# Patient Record
Sex: FEMALE | Race: WHITE | Employment: FULL TIME | URBAN - METROPOLITAN AREA
[De-identification: names, ages, dates, MRNs, and addresses within clinical notes are randomized per-mention and may not be internally consistent; named-entity substitution may affect disease eponyms.]

---

## 2017-01-11 ENCOUNTER — OFFICE VISIT (OUTPATIENT)
Dept: RHEUMATOLOGY | Facility: CLINIC | Age: 42
End: 2017-01-11

## 2017-01-11 VITALS
HEIGHT: 64 IN | DIASTOLIC BLOOD PRESSURE: 85 MMHG | WEIGHT: 223.19 LBS | SYSTOLIC BLOOD PRESSURE: 130 MMHG | HEART RATE: 82 BPM | BODY MASS INDEX: 38.1 KG/M2

## 2017-01-11 DIAGNOSIS — M79.7 FIBROMYALGIA: ICD-10-CM

## 2017-01-11 DIAGNOSIS — M35.00 SJOGREN'S SYNDROME (HCC): Primary | ICD-10-CM

## 2017-01-11 PROCEDURE — 99212 OFFICE O/P EST SF 10 MIN: CPT | Performed by: INTERNAL MEDICINE

## 2017-01-11 PROCEDURE — 99213 OFFICE O/P EST LOW 20 MIN: CPT | Performed by: INTERNAL MEDICINE

## 2017-01-11 NOTE — PROGRESS NOTES
Dear Dr. Shayne Rasheed:    I saw Michaelle Leigh in follow-up this morning in my rheumatology clinic. She is taking cyclobenzaprine 5 mg at bedtime, and feeling much better. She is sleeping. Her energy is good. Her pain levels are much decreased.     I received will continue taking good care of her eyes and teeth. She will schedule a follow-up appointment with me in 6 months. Thank you for inviting me to participate in her care. Sincerely,      Brenden Vallejo MD   Rheumatology.

## 2017-03-06 ENCOUNTER — APPOINTMENT (OUTPATIENT)
Dept: LAB | Facility: HOSPITAL | Age: 42
End: 2017-03-06
Attending: INTERNAL MEDICINE
Payer: COMMERCIAL

## 2017-03-06 ENCOUNTER — OFFICE VISIT (OUTPATIENT)
Dept: RHEUMATOLOGY | Facility: CLINIC | Age: 42
End: 2017-03-06

## 2017-03-06 VITALS
BODY MASS INDEX: 38.27 KG/M2 | HEIGHT: 64 IN | RESPIRATION RATE: 99 BRPM | SYSTOLIC BLOOD PRESSURE: 130 MMHG | DIASTOLIC BLOOD PRESSURE: 85 MMHG | WEIGHT: 224.19 LBS | HEART RATE: 80 BPM

## 2017-03-06 DIAGNOSIS — R53.83 FATIGUE, UNSPECIFIED TYPE: ICD-10-CM

## 2017-03-06 DIAGNOSIS — M35.01 SJOGREN'S SYNDROME WITH KERATOCONJUNCTIVITIS SICCA (HCC): ICD-10-CM

## 2017-03-06 DIAGNOSIS — M79.7 FIBROMYALGIA: ICD-10-CM

## 2017-03-06 DIAGNOSIS — M35.01 SJOGREN'S SYNDROME WITH KERATOCONJUNCTIVITIS SICCA (HCC): Primary | ICD-10-CM

## 2017-03-06 LAB
ALBUMIN SERPL BCP-MCNC: 3.4 G/DL (ref 3.5–4.8)
ALBUMIN/GLOB SERPL: 0.8 {RATIO} (ref 1–2)
ALP SERPL-CCNC: 131 U/L (ref 32–100)
ALT SERPL-CCNC: 253 U/L (ref 14–54)
ANION GAP SERPL CALC-SCNC: 10 MMOL/L (ref 0–18)
AST SERPL-CCNC: 142 U/L (ref 15–41)
BILIRUB SERPL-MCNC: 1.4 MG/DL (ref 0.3–1.2)
BUN SERPL-MCNC: 6 MG/DL (ref 8–20)
BUN/CREAT SERPL: 7.2 (ref 10–20)
C3 SERPL-MCNC: 197 MG/DL (ref 88–201)
C4 SERPL-MCNC: 41 MG/DL (ref 18–55)
CALCIUM SERPL-MCNC: 9.2 MG/DL (ref 8.5–10.5)
CHLORIDE SERPL-SCNC: 104 MMOL/L (ref 95–110)
CO2 SERPL-SCNC: 23 MMOL/L (ref 22–32)
CREAT SERPL-MCNC: 0.83 MG/DL (ref 0.5–1.5)
ERYTHROCYTE [DISTWIDTH] IN BLOOD BY AUTOMATED COUNT: 13.3 % (ref 11–15)
ERYTHROCYTE [SEDIMENTATION RATE] IN BLOOD: 49 MM/HR (ref 0–20)
GLOBULIN PLAS-MCNC: 4.5 G/DL (ref 2.5–3.7)
GLUCOSE SERPL-MCNC: 96 MG/DL (ref 70–99)
HCT VFR BLD AUTO: 40.2 % (ref 35–48)
HGB BLD-MCNC: 13.5 G/DL (ref 12–16)
MCH RBC QN AUTO: 31.9 PG (ref 27–32)
MCHC RBC AUTO-ENTMCNC: 33.5 G/DL (ref 32–37)
MCV RBC AUTO: 95.1 FL (ref 80–100)
OSMOLALITY UR CALC.SUM OF ELEC: 281 MOSM/KG (ref 275–295)
PLATELET # BLD AUTO: 268 K/UL (ref 140–400)
PMV BLD AUTO: 8.4 FL (ref 7.4–10.3)
POTASSIUM SERPL-SCNC: 4.3 MMOL/L (ref 3.3–5.1)
PROT SERPL-MCNC: 7.9 G/DL (ref 5.9–8.4)
RBC # BLD AUTO: 4.23 M/UL (ref 3.7–5.4)
SODIUM SERPL-SCNC: 137 MMOL/L (ref 136–144)
WBC # BLD AUTO: 5.6 K/UL (ref 4–11)

## 2017-03-06 PROCEDURE — 85027 COMPLETE CBC AUTOMATED: CPT

## 2017-03-06 PROCEDURE — 99213 OFFICE O/P EST LOW 20 MIN: CPT | Performed by: INTERNAL MEDICINE

## 2017-03-06 PROCEDURE — 85652 RBC SED RATE AUTOMATED: CPT

## 2017-03-06 PROCEDURE — 84165 PROTEIN E-PHORESIS SERUM: CPT

## 2017-03-06 PROCEDURE — 99212 OFFICE O/P EST SF 10 MIN: CPT | Performed by: INTERNAL MEDICINE

## 2017-03-06 PROCEDURE — 36415 COLL VENOUS BLD VENIPUNCTURE: CPT

## 2017-03-06 PROCEDURE — 86160 COMPLEMENT ANTIGEN: CPT

## 2017-03-06 PROCEDURE — 80053 COMPREHEN METABOLIC PANEL: CPT

## 2017-03-06 RX ORDER — METHYLPREDNISOLONE 4 MG/1
TABLET ORAL
Qty: 21 TABLET | Refills: 0 | Status: SHIPPED | OUTPATIENT
Start: 2017-03-06 | End: 2018-07-20 | Stop reason: ALTCHOICE

## 2017-03-06 NOTE — PROGRESS NOTES
HPI:    Patient ID: Amparo Portillo is a 37year old female. HPI Comments: Yane Ashley is a 60-year-old patient of Dr. Vinicio Desir with diffuse myofascial pain syndrome. That has continued to be much improved on cyclobenzaprine 5 mg at bedtime.       She has a Sertraline HCl 50 MG Oral Tab Take 50 mg by mouth daily. Disp:  Rfl:    Multiple Vitamins-Minerals (MULTIVITAL) Oral Tab Take 1 tablet by mouth daily. Disp:  Rfl:    Cyclobenzaprine HCl 5 MG Oral Tab Take 1-2 PO Q HS.  Disp: 60 tablet Rfl: 2     Allergies summer when she had cholestasis. I will notify her of the results. Orders Placed This Encounter  Comp Metabolic Panel  CBC, Platelet;  No Differential  Serum Protein Electrophoresis  ESR  Complement C3, Serum  Complement C4, Serum    Meds This Visit:

## 2017-03-07 ENCOUNTER — TELEPHONE (OUTPATIENT)
Dept: RHEUMATOLOGY | Facility: CLINIC | Age: 42
End: 2017-03-07

## 2017-03-07 DIAGNOSIS — K83.1 CHOLESTASIS: Primary | ICD-10-CM

## 2017-03-07 LAB
ALBUMIN SERPL ELPH-MCNC: 4.15 G/DL (ref 3.8–5.8)
ALBUMIN/GLOB SERPL: 1.11 {RATIO} (ref 1–2)
ALPHA1 GLOB SERPL ELPH-MCNC: 0.26 G/DL (ref 0.1–0.3)
ALPHA2 GLOB SERPL ELPH-MCNC: 0.95 G/DL (ref 0.6–1)
B-GLOBULIN SERPL ELPH-MCNC: 1.45 G/DL (ref 0.7–1.3)
GAMMA GLOB SERPL ELPH-MCNC: 1.1 G/DL (ref 0.5–1.7)
TOTAL PROTEIN (SPECIAL TESTING): 7.9 G/DL (ref 6.5–9.1)

## 2017-03-07 NOTE — TELEPHONE ENCOUNTER
I called Martha Martinez with her lab results from yesterday. As she expected, her liver tests are again elevated. Her ALT was 253, , alk phos 131, total bili 1.4, albumin 3.4, globulins 4.5. Sed rate 49.   CBC normal.  C3 and C4 complements normal.  SPE

## 2017-03-07 NOTE — TELEPHONE ENCOUNTER
Pt given central schedule phone number to schedule US gallbladder. She will call office back if there are any scheduling issues.

## 2017-03-08 PROBLEM — M35.00 SJOGREN'S DISEASE (HCC): Status: ACTIVE | Noted: 2017-03-08

## 2017-03-08 PROBLEM — M79.7 FIBROMYALGIA: Status: ACTIVE | Noted: 2017-03-08

## 2017-03-08 PROCEDURE — 87624 HPV HI-RISK TYP POOLED RSLT: CPT | Performed by: OBSTETRICS & GYNECOLOGY

## 2017-03-08 PROCEDURE — 88175 CYTOPATH C/V AUTO FLUID REDO: CPT | Performed by: OBSTETRICS & GYNECOLOGY

## 2017-03-09 ENCOUNTER — HOSPITAL ENCOUNTER (OUTPATIENT)
Dept: ULTRASOUND IMAGING | Age: 42
Discharge: HOME | End: 2017-03-09
Attending: INTERNAL MEDICINE
Payer: COMMERCIAL

## 2017-03-09 DIAGNOSIS — K83.1 CHOLESTASIS: ICD-10-CM

## 2017-03-09 PROCEDURE — 76705 ECHO EXAM OF ABDOMEN: CPT

## 2017-03-17 ENCOUNTER — TELEPHONE (OUTPATIENT)
Dept: RHEUMATOLOGY | Facility: CLINIC | Age: 42
End: 2017-03-17

## 2017-03-17 NOTE — TELEPHONE ENCOUNTER
I called Nathanael Langston with the results of her gallbladder ultrasound done on March 9th. There is a prominent gallbladder, with possible mild distention.   A nuclear medicine hepatobiliary study with fatty meal was suggested to evaluate gallbladder wall functio

## 2017-04-28 RX ORDER — CYCLOBENZAPRINE HCL 5 MG
TABLET ORAL
Qty: 60 TABLET | Refills: 2 | Status: SHIPPED | OUTPATIENT
Start: 2017-04-28 | End: 2017-09-14

## 2017-04-28 NOTE — TELEPHONE ENCOUNTER
Patient is requesting refill for Cyclobenzaprine 5 mg, to take 1-2 tablets at bedtime. LOV 03/06/17. Last refill on 12/28/16 for 60 tablets / 2 refills. Please advise.

## 2017-09-14 RX ORDER — CYCLOBENZAPRINE HCL 5 MG
TABLET ORAL
Qty: 60 TABLET | Refills: 0 | Status: SHIPPED | OUTPATIENT
Start: 2017-09-14 | End: 2017-10-12

## 2017-10-12 RX ORDER — CYCLOBENZAPRINE HCL 5 MG
TABLET ORAL
Qty: 60 TABLET | Refills: 0 | Status: SHIPPED | OUTPATIENT
Start: 2017-10-12 | End: 2017-10-18

## 2017-10-12 NOTE — TELEPHONE ENCOUNTER
LOV:3-6  Last Filled:9-14, #60 with 0 refill  Labs:   Future Appointments  Date Time Provider Kaden Aguilar   10/18/2017 11:00 AM Khurram Perry MD [facility] [place] [facility] [place]       Please Advise

## 2017-10-17 NOTE — PROGRESS NOTES
Chema Ricardo is a 81-year-old patient of Dr. Kaitlin Griggs with diffuse myofascial pain syndrome. That has continued to be much improved on cyclobenzaprine 5 mg at bedtime. She will take 10 mg at bedtime on weekends.     6 months ago she stopped her birth control pi Vitamins-Minerals (MULTIVITAL) Oral Tab Take 1 tablet by mouth daily. Disp:  Rfl:    Cyclobenzaprine HCl 5 MG Oral Tab Take 1-2 PO Q HS.  Disp: 60 tablet Rfl: 2     Allergies:No Known Allergies   PHYSICAL EXAM:   Physical Exam   Constitutional: She is orien pills.  She is considering Mirena. This aggravates her fibromyalgia symptoms.

## 2017-10-18 ENCOUNTER — APPOINTMENT (OUTPATIENT)
Dept: LAB | Age: 42
End: 2017-10-18
Attending: INTERNAL MEDICINE
Payer: COMMERCIAL

## 2017-10-18 ENCOUNTER — OFFICE VISIT (OUTPATIENT)
Dept: RHEUMATOLOGY | Facility: CLINIC | Age: 42
End: 2017-10-18

## 2017-10-18 VITALS
HEIGHT: 64 IN | WEIGHT: 221.63 LBS | SYSTOLIC BLOOD PRESSURE: 116 MMHG | HEART RATE: 80 BPM | DIASTOLIC BLOOD PRESSURE: 81 MMHG | BODY MASS INDEX: 37.84 KG/M2

## 2017-10-18 DIAGNOSIS — M35.01 SJOGREN'S SYNDROME WITH KERATOCONJUNCTIVITIS SICCA (HCC): ICD-10-CM

## 2017-10-18 DIAGNOSIS — M35.01 SJOGREN'S SYNDROME WITH KERATOCONJUNCTIVITIS SICCA (HCC): Primary | ICD-10-CM

## 2017-10-18 DIAGNOSIS — M79.7 FIBROMYALGIA: ICD-10-CM

## 2017-10-18 DIAGNOSIS — K83.1 CHOLESTASIS: ICD-10-CM

## 2017-10-18 PROCEDURE — 99212 OFFICE O/P EST SF 10 MIN: CPT | Performed by: INTERNAL MEDICINE

## 2017-10-18 PROCEDURE — 80053 COMPREHEN METABOLIC PANEL: CPT

## 2017-10-18 PROCEDURE — 84165 PROTEIN E-PHORESIS SERUM: CPT

## 2017-10-18 PROCEDURE — 99214 OFFICE O/P EST MOD 30 MIN: CPT | Performed by: INTERNAL MEDICINE

## 2017-10-18 PROCEDURE — 85652 RBC SED RATE AUTOMATED: CPT

## 2017-10-18 PROCEDURE — 36415 COLL VENOUS BLD VENIPUNCTURE: CPT

## 2017-10-18 PROCEDURE — 85027 COMPLETE CBC AUTOMATED: CPT

## 2017-10-18 RX ORDER — CYCLOBENZAPRINE HCL 5 MG
TABLET ORAL
Qty: 180 TABLET | Refills: 3 | Status: SHIPPED | OUTPATIENT
Start: 2017-10-18 | End: 2018-07-20

## 2017-12-21 RX ORDER — CYCLOBENZAPRINE HCL 5 MG
TABLET ORAL
Qty: 180 TABLET | Refills: 3 | Status: SHIPPED | OUTPATIENT
Start: 2017-12-21 | End: 2018-07-20

## 2018-04-20 PROCEDURE — 88175 CYTOPATH C/V AUTO FLUID REDO: CPT | Performed by: OBSTETRICS & GYNECOLOGY

## 2018-04-20 PROCEDURE — 87624 HPV HI-RISK TYP POOLED RSLT: CPT | Performed by: OBSTETRICS & GYNECOLOGY

## 2018-07-16 NOTE — PROGRESS NOTES
Martha Martinez is a 41-year-old patient of Dr. Earlene Villalobos with diffuse myofascial pain syndrome. I last saw her October 18th of 2017. She doesn't think  cyclobenzaprine 5 mg at bedtime during the week, and 10 mg on weekends is working as well.   She does not feel ref Constitutional: Positive for fatigue. Negative for diaphoresis. Respiratory: Negative for cough. Negative for shortness of breath. Cardiovascular: Negative for chest pain. Gastrointestinal: Negative for nausea and abdominal pain.  Negative for vomi liver tests. Her hepatitis B core IgM antibody was positive June of 2016. Her liver tests returned to normal. Sjogren's can be associated with histologic changes of primary biliary cirrhosis. There can be lab abnormalities, usually mild in Sjogren's.  There

## 2018-07-20 ENCOUNTER — APPOINTMENT (OUTPATIENT)
Dept: LAB | Facility: HOSPITAL | Age: 43
End: 2018-07-20
Attending: INTERNAL MEDICINE
Payer: COMMERCIAL

## 2018-07-20 ENCOUNTER — OFFICE VISIT (OUTPATIENT)
Dept: RHEUMATOLOGY | Facility: CLINIC | Age: 43
End: 2018-07-20
Payer: COMMERCIAL

## 2018-07-20 VITALS
DIASTOLIC BLOOD PRESSURE: 83 MMHG | BODY MASS INDEX: 38.65 KG/M2 | SYSTOLIC BLOOD PRESSURE: 113 MMHG | HEIGHT: 64 IN | WEIGHT: 226.38 LBS | HEART RATE: 77 BPM

## 2018-07-20 DIAGNOSIS — K83.1 CHOLESTASIS: ICD-10-CM

## 2018-07-20 DIAGNOSIS — M79.7 FIBROMYALGIA: ICD-10-CM

## 2018-07-20 DIAGNOSIS — M35.01 SJOGREN'S SYNDROME WITH KERATOCONJUNCTIVITIS SICCA (HCC): ICD-10-CM

## 2018-07-20 DIAGNOSIS — M35.01 SJOGREN'S SYNDROME WITH KERATOCONJUNCTIVITIS SICCA (HCC): Primary | ICD-10-CM

## 2018-07-20 LAB
ALBUMIN SERPL BCP-MCNC: 4 G/DL (ref 3.5–4.8)
ALBUMIN/GLOB SERPL: 1 {RATIO} (ref 1–2)
ALP SERPL-CCNC: 81 U/L (ref 32–100)
ALT SERPL-CCNC: 41 U/L (ref 14–54)
ANION GAP SERPL CALC-SCNC: 6 MMOL/L (ref 0–18)
AST SERPL-CCNC: 31 U/L (ref 15–41)
BILIRUB SERPL-MCNC: 0.5 MG/DL (ref 0.3–1.2)
BUN SERPL-MCNC: 11 MG/DL (ref 8–20)
BUN/CREAT SERPL: 12.9 (ref 10–20)
CALCIUM SERPL-MCNC: 9.8 MG/DL (ref 8.5–10.5)
CHLORIDE SERPL-SCNC: 106 MMOL/L (ref 95–110)
CO2 SERPL-SCNC: 26 MMOL/L (ref 22–32)
CREAT SERPL-MCNC: 0.85 MG/DL (ref 0.5–1.5)
ERYTHROCYTE [DISTWIDTH] IN BLOOD BY AUTOMATED COUNT: 13.5 % (ref 11–15)
GLOBULIN PLAS-MCNC: 4 G/DL (ref 2.5–3.7)
GLUCOSE SERPL-MCNC: 84 MG/DL (ref 70–99)
HCT VFR BLD AUTO: 41.6 % (ref 35–48)
HGB BLD-MCNC: 13.7 G/DL (ref 12–16)
MCH RBC QN AUTO: 31.4 PG (ref 27–32)
MCHC RBC AUTO-ENTMCNC: 32.9 G/DL (ref 32–37)
MCV RBC AUTO: 95.6 FL (ref 80–100)
OSMOLALITY UR CALC.SUM OF ELEC: 285 MOSM/KG (ref 275–295)
PATIENT FASTING: YES
PLATELET # BLD AUTO: 259 K/UL (ref 140–400)
PMV BLD AUTO: 7.7 FL (ref 7.4–10.3)
POTASSIUM SERPL-SCNC: 4.6 MMOL/L (ref 3.3–5.1)
PROT SERPL-MCNC: 8 G/DL (ref 5.9–8.4)
RBC # BLD AUTO: 4.36 M/UL (ref 3.7–5.4)
SODIUM SERPL-SCNC: 138 MMOL/L (ref 136–144)
WBC # BLD AUTO: 7.9 K/UL (ref 4–11)

## 2018-07-20 PROCEDURE — 85027 COMPLETE CBC AUTOMATED: CPT

## 2018-07-20 PROCEDURE — 80053 COMPREHEN METABOLIC PANEL: CPT

## 2018-07-20 PROCEDURE — 36415 COLL VENOUS BLD VENIPUNCTURE: CPT

## 2018-07-20 PROCEDURE — 99214 OFFICE O/P EST MOD 30 MIN: CPT | Performed by: INTERNAL MEDICINE

## 2018-07-20 PROCEDURE — 99212 OFFICE O/P EST SF 10 MIN: CPT | Performed by: INTERNAL MEDICINE

## 2018-07-20 PROCEDURE — 84165 PROTEIN E-PHORESIS SERUM: CPT

## 2018-07-20 RX ORDER — GABAPENTIN 300 MG/1
CAPSULE ORAL
Qty: 60 CAPSULE | Refills: 5 | Status: SHIPPED | OUTPATIENT
Start: 2018-07-20 | End: 2019-04-13

## 2018-07-20 RX ORDER — ESCITALOPRAM OXALATE 10 MG/1
10 TABLET ORAL DAILY
COMMUNITY
Start: 2018-07-19

## 2018-07-24 LAB
ALBUMIN SERPL ELPH-MCNC: 3.98 G/DL (ref 3.75–5.21)
ALBUMIN/GLOB SERPL: 1.1 {RATIO} (ref 1–2)
ALPHA1 GLOB SERPL ELPH-MCNC: 0.28 G/DL (ref 0.19–0.46)
ALPHA2 GLOB SERPL ELPH-MCNC: 0.71 G/DL (ref 0.48–1.05)
B-GLOBULIN SERPL ELPH-MCNC: 1.25 G/DL (ref 0.68–1.23)
GAMMA GLOB SERPL ELPH-MCNC: 1.37 G/DL (ref 0.62–1.7)
TOTAL PROTEIN (SPECIAL TESTING): 7.6 G/DL (ref 6.5–9.1)

## 2019-04-13 ENCOUNTER — OFFICE VISIT (OUTPATIENT)
Dept: RHEUMATOLOGY | Facility: CLINIC | Age: 44
End: 2019-04-13
Payer: COMMERCIAL

## 2019-04-13 ENCOUNTER — APPOINTMENT (OUTPATIENT)
Dept: LAB | Facility: HOSPITAL | Age: 44
End: 2019-04-13
Attending: INTERNAL MEDICINE
Payer: COMMERCIAL

## 2019-04-13 VITALS
SYSTOLIC BLOOD PRESSURE: 125 MMHG | DIASTOLIC BLOOD PRESSURE: 87 MMHG | WEIGHT: 230 LBS | HEART RATE: 83 BPM | BODY MASS INDEX: 39.27 KG/M2 | HEIGHT: 64 IN

## 2019-04-13 DIAGNOSIS — K83.1 CHOLESTASIS: ICD-10-CM

## 2019-04-13 DIAGNOSIS — M35.01 SJOGREN'S SYNDROME WITH KERATOCONJUNCTIVITIS SICCA (HCC): ICD-10-CM

## 2019-04-13 DIAGNOSIS — M79.7 FIBROMYALGIA: ICD-10-CM

## 2019-04-13 DIAGNOSIS — M35.01 SJOGREN'S SYNDROME WITH KERATOCONJUNCTIVITIS SICCA (HCC): Primary | ICD-10-CM

## 2019-04-13 PROCEDURE — 36415 COLL VENOUS BLD VENIPUNCTURE: CPT

## 2019-04-13 PROCEDURE — 85027 COMPLETE CBC AUTOMATED: CPT

## 2019-04-13 PROCEDURE — 99212 OFFICE O/P EST SF 10 MIN: CPT | Performed by: INTERNAL MEDICINE

## 2019-04-13 PROCEDURE — 84165 PROTEIN E-PHORESIS SERUM: CPT

## 2019-04-13 PROCEDURE — 99214 OFFICE O/P EST MOD 30 MIN: CPT | Performed by: INTERNAL MEDICINE

## 2019-04-13 PROCEDURE — 80053 COMPREHEN METABOLIC PANEL: CPT

## 2019-04-13 RX ORDER — CYCLOBENZAPRINE HCL 5 MG
TABLET ORAL
Qty: 90 TABLET | Refills: 3 | Status: SHIPPED | OUTPATIENT
Start: 2019-04-13

## 2019-04-13 RX ORDER — CYCLOBENZAPRINE HCL 5 MG
5 TABLET ORAL NIGHTLY
Refills: 0 | COMMUNITY
Start: 2019-02-02 | End: 2019-04-13

## 2019-04-13 NOTE — PROGRESS NOTES
Nathanael Langston is a 21-year-old patient of Dr. Uday Cabrales with diffuse myofascial pain syndrome. I last saw her July 20th of 2018. She is taking cyclobenzaprine 5 mg at bedtime, and Lexapro 10mg daily. It is working well. She has a less stressful job.   She gen Pulmonary/Chest: Breath sounds normal.   Abdominal: Soft. Bowel sounds are normal. She exhibits no mass. There is no tenderness. There is no rebound and no guarding. Musculoskeletal: She exhibits no edema.    There isn't synovitis in her wrists or hands

## 2021-03-26 PROCEDURE — 88305 TISSUE EXAM BY PATHOLOGIST: CPT | Performed by: OBSTETRICS & GYNECOLOGY

## 2022-06-27 ENCOUNTER — OFFICE VISIT (OUTPATIENT)
Dept: RHEUMATOLOGY | Facility: CLINIC | Age: 47
End: 2022-06-27
Payer: COMMERCIAL

## 2022-06-27 ENCOUNTER — LAB ENCOUNTER (OUTPATIENT)
Dept: LAB | Facility: HOSPITAL | Age: 47
End: 2022-06-27
Attending: INTERNAL MEDICINE
Payer: COMMERCIAL

## 2022-06-27 VITALS
SYSTOLIC BLOOD PRESSURE: 129 MMHG | BODY MASS INDEX: 38.24 KG/M2 | HEIGHT: 64 IN | HEART RATE: 71 BPM | WEIGHT: 224 LBS | DIASTOLIC BLOOD PRESSURE: 87 MMHG

## 2022-06-27 DIAGNOSIS — M35.00 SJOGREN'S SYNDROME, WITH UNSPECIFIED ORGAN INVOLVEMENT (HCC): ICD-10-CM

## 2022-06-27 DIAGNOSIS — M79.7 FIBROMYALGIA: ICD-10-CM

## 2022-06-27 DIAGNOSIS — M35.00 SJOGREN'S SYNDROME, WITH UNSPECIFIED ORGAN INVOLVEMENT (HCC): Primary | ICD-10-CM

## 2022-06-27 LAB
ALBUMIN SERPL-MCNC: 3.7 G/DL (ref 3.4–5)
ALBUMIN/GLOB SERPL: 0.8 {RATIO} (ref 1–2)
ALP LIVER SERPL-CCNC: 61 U/L
ALT SERPL-CCNC: 27 U/L
ANION GAP SERPL CALC-SCNC: 7 MMOL/L (ref 0–18)
AST SERPL-CCNC: 22 U/L (ref 15–37)
BILIRUB SERPL-MCNC: 0.2 MG/DL (ref 0.1–2)
BUN BLD-MCNC: 17 MG/DL (ref 7–18)
BUN/CREAT SERPL: 22.1 (ref 10–20)
C3 SERPL-MCNC: 117 MG/DL (ref 90–180)
C4 SERPL-MCNC: 31.8 MG/DL (ref 10–40)
CALCIUM BLD-MCNC: 9.2 MG/DL (ref 8.5–10.1)
CHLORIDE SERPL-SCNC: 104 MMOL/L (ref 98–112)
CO2 SERPL-SCNC: 27 MMOL/L (ref 21–32)
CREAT BLD-MCNC: 0.77 MG/DL
DEPRECATED RDW RBC AUTO: 44.2 FL (ref 35.1–46.3)
ERYTHROCYTE [DISTWIDTH] IN BLOOD BY AUTOMATED COUNT: 12.5 % (ref 11–15)
FASTING STATUS PATIENT QL REPORTED: NO
GLOBULIN PLAS-MCNC: 4.4 G/DL (ref 2.8–4.4)
GLUCOSE BLD-MCNC: 80 MG/DL (ref 70–99)
HCT VFR BLD AUTO: 40.8 %
HGB BLD-MCNC: 13.2 G/DL
MCH RBC QN AUTO: 31.2 PG (ref 26–34)
MCHC RBC AUTO-ENTMCNC: 32.4 G/DL (ref 31–37)
MCV RBC AUTO: 96.5 FL
OSMOLALITY SERPL CALC.SUM OF ELEC: 287 MOSM/KG (ref 275–295)
PLATELET # BLD AUTO: 273 10(3)UL (ref 150–450)
POTASSIUM SERPL-SCNC: 4.3 MMOL/L (ref 3.5–5.1)
PROT SERPL-MCNC: 8.1 G/DL (ref 6.4–8.2)
RBC # BLD AUTO: 4.23 X10(6)UL
SODIUM SERPL-SCNC: 138 MMOL/L (ref 136–145)
WBC # BLD AUTO: 8.1 X10(3) UL (ref 4–11)

## 2022-06-27 PROCEDURE — 86160 COMPLEMENT ANTIGEN: CPT

## 2022-06-27 PROCEDURE — 36415 COLL VENOUS BLD VENIPUNCTURE: CPT

## 2022-06-27 PROCEDURE — 84165 PROTEIN E-PHORESIS SERUM: CPT

## 2022-06-27 PROCEDURE — 80053 COMPREHEN METABOLIC PANEL: CPT

## 2022-06-27 PROCEDURE — 85027 COMPLETE CBC AUTOMATED: CPT

## 2022-06-27 RX ORDER — CYCLOBENZAPRINE HCL 5 MG
TABLET ORAL
Qty: 90 TABLET | Refills: 3 | Status: SHIPPED | OUTPATIENT
Start: 2022-06-27

## 2022-06-27 NOTE — PROGRESS NOTES
Darren Jenkins is a 43-year-old patient of Dr. Radha Crandall, with diffuse myofascial pain syndrome. I last saw her April 13th of 2019. She had been taking cyclobenzaprine 5 mg at bedtime, until running out several months ago. It had definitely been helping. She has been tossing and turning more at night. She has not been refreshed on awakening, and has decreased energy. She remains on Lexapro 10mg daily through Dr. Radha Crandall. It is working well. She has Sjogren's Syndrome, with SSA antibody and positive JOSE ANTONIO. No rash. No pleurisy. She has a mouthwash, and uses Systane eyedrops. Her dry eyes and mouth are reasonable. She has not had swollen glands in her neck. Her last labs were April 13th, 2019. CMP was normal, except ALT of 63, total protein 8.3, globulins 4.6. CBC and SPEP were normal.     In 2017 she stopped her birth control pill because of an episode of cholestasis. She had been on BCPs for PMS. Her ultrasound showed prominent gallbladder, mild fatty infiltration and/or hepatocellular disease. She has a history of cholestasis and elevated liver tests in June of 2016. Her hepatitis B core IgM antibody was positive in June 2016. Her itching went away, and her liver tests return to normal.     Review of Systems   No lymphadenopathy. She is working with a . When she does she hurts more at night making it harder to sleep. No fevers, chills, sweats, anorexia, weight loss, rash, oral or nasal ulcers, shortness of breath, chest pain, acid reflux, stomach pain, nausea or vomiting, constipation or diarrhea, blood in her stools. No trouble urinating. No Raynaud's. She has a lot of tension headaches. Her hair is thinning. When she gets out of bed in the morning she can have discomfort across her hand MCP joints but they are not swollen and she can make tight fist and  objects.     Allergies:No Known Allergies     PHYSICAL EXAM:   Blood pressure 129/87, pulse 71, height 5' 4\" (1.626 m), weight 224 lb (101.6 kg). Constitutional: She is oriented to person, place, and time. She appears well-developed. HENT:   Head: Normocephalic. Eyes: Conjunctivae are normal.   Cardiovascular: Normal rate, regular rhythm and normal heart sounds. Pulmonary/Chest: Breath sounds normal.   Abdominal: Soft. Bowel sounds are normal. She exhibits no mass. There is no tenderness. There is no rebound and no guarding. Musculoskeletal: She exhibits no edema. There isn't synovitis in her wrists or hands.  strength is good. Lymphadenopathy:     She has no cervical adenopathy. Neurological: She is alert and oriented to person, place, and time. Skin: No rash noted. Psychiatric: She has a normal mood and affect. ASSESSMENT/PLAN:   1. Diffuse myofascial pain syndrome. It has flared since she stopped her cyclobenzaprine. It was refilled, at 5 mg every bedtime. Doing well on Lexapro through Dr. Zeferino Yeager for anxiety. 2. History of cholestasis and elevated liver tests. Her hepatitis B core IgM antibody was positive June of 2016. Her liver tests returned to normal. Sjogren's can be associated with histologic changes of primary biliary cirrhosis. There can be lab abnormalities, usually mild in Sjogren's. There can be autoimmune hepatitis. It can be hepatocellular or primarily cholestasis. She had another episode with itching in March of 2017, likely from birth control pills. She has been off BCPs since, without recurrence. Repeat LFTs today. 3. Sjogren's syndrome, with dry eyes and dry mouth, SSA antibody, positive JOSE ANTONIO. She will continue taking good care of her eyes and teeth. CBC, CMP, C3 and C4 complements, and SPEP today. If okay, she will schedule back in 12 months. 4.  PMS. Anxiety. Much improved.

## 2022-06-28 LAB
ALBUMIN SERPL ELPH-MCNC: 4.3 G/DL (ref 3.75–5.21)
ALBUMIN/GLOB SERPL: 1.26 {RATIO} (ref 1–2)
ALPHA1 GLOB SERPL ELPH-MCNC: 0.25 G/DL (ref 0.19–0.46)
ALPHA2 GLOB SERPL ELPH-MCNC: 0.74 G/DL (ref 0.48–1.05)
B-GLOBULIN SERPL ELPH-MCNC: 1.12 G/DL (ref 0.68–1.23)
GAMMA GLOB SERPL ELPH-MCNC: 1.29 G/DL (ref 0.62–1.7)
PROT SERPL-MCNC: 7.7 G/DL (ref 6.4–8.2)

## 2022-07-09 ENCOUNTER — HOSPITAL ENCOUNTER (OUTPATIENT)
Dept: MAMMOGRAPHY | Facility: HOSPITAL | Age: 47
Discharge: HOME | End: 2022-07-09
Attending: FAMILY MEDICINE
Payer: COMMERCIAL

## 2022-07-09 DIAGNOSIS — Z01.419 ENCOUNTER FOR ANNUAL ROUTINE GYNECOLOGICAL EXAMINATION: ICD-10-CM

## 2022-07-09 DIAGNOSIS — Z12.31 ENCOUNTER FOR SCREENING MAMMOGRAM FOR MALIGNANT NEOPLASM OF BREAST: ICD-10-CM

## 2022-07-09 PROCEDURE — 77063 BREAST TOMOSYNTHESIS BI: CPT | Performed by: OBSTETRICS & GYNECOLOGY

## 2022-07-09 PROCEDURE — 77067 SCR MAMMO BI INCL CAD: CPT | Performed by: OBSTETRICS & GYNECOLOGY

## 2022-08-01 ENCOUNTER — HOSPITAL ENCOUNTER (OUTPATIENT)
Dept: MAMMOGRAPHY | Facility: HOSPITAL | Age: 47
Discharge: HOME | End: 2022-08-01
Attending: OBSTETRICS & GYNECOLOGY
Payer: COMMERCIAL

## 2022-08-01 ENCOUNTER — HOSPITAL ENCOUNTER (OUTPATIENT)
Dept: ULTRASOUND IMAGING | Facility: HOSPITAL | Age: 47
Discharge: HOME | End: 2022-08-01
Attending: OBSTETRICS & GYNECOLOGY
Payer: COMMERCIAL

## 2022-08-01 DIAGNOSIS — R92.8 ABNORMAL MAMMOGRAM: ICD-10-CM

## 2022-08-01 PROCEDURE — 76642 ULTRASOUND BREAST LIMITED: CPT | Performed by: OBSTETRICS & GYNECOLOGY

## 2022-08-01 PROCEDURE — 77062 BREAST TOMOSYNTHESIS BI: CPT | Performed by: OBSTETRICS & GYNECOLOGY

## 2022-08-01 PROCEDURE — 77066 DX MAMMO INCL CAD BI: CPT | Performed by: OBSTETRICS & GYNECOLOGY

## 2022-11-29 RX ORDER — CYCLOBENZAPRINE HCL 5 MG
TABLET ORAL
Qty: 90 TABLET | Refills: 3 | Status: SHIPPED | OUTPATIENT
Start: 2022-11-29

## 2022-12-20 ENCOUNTER — TELEPHONE (OUTPATIENT)
Dept: RHEUMATOLOGY | Facility: CLINIC | Age: 47
End: 2022-12-20

## 2022-12-20 NOTE — TELEPHONE ENCOUNTER
Pt is not getting cyclobenzaprine refilled at [facility], it is getting fills at [facility].

## 2023-02-08 ENCOUNTER — TELEPHONE (OUTPATIENT)
Dept: RHEUMATOLOGY | Facility: CLINIC | Age: 48
End: 2023-02-08

## 2023-03-07 ENCOUNTER — HOSPITAL ENCOUNTER (OUTPATIENT)
Dept: ULTRASOUND IMAGING | Facility: HOSPITAL | Age: 48
Discharge: HOME | End: 2023-03-07
Attending: OBSTETRICS & GYNECOLOGY
Payer: COMMERCIAL

## 2023-03-07 ENCOUNTER — HOSPITAL ENCOUNTER (OUTPATIENT)
Dept: MAMMOGRAPHY | Facility: HOSPITAL | Age: 48
Discharge: HOME | End: 2023-03-07
Attending: OBSTETRICS & GYNECOLOGY
Payer: COMMERCIAL

## 2023-03-07 DIAGNOSIS — N63.10 MASS OF RIGHT BREAST, UNSPECIFIED QUADRANT: ICD-10-CM

## 2023-03-07 PROCEDURE — 77066 DX MAMMO INCL CAD BI: CPT | Performed by: OBSTETRICS & GYNECOLOGY

## 2023-03-07 PROCEDURE — 77062 BREAST TOMOSYNTHESIS BI: CPT | Performed by: OBSTETRICS & GYNECOLOGY

## 2023-03-07 PROCEDURE — 76642 ULTRASOUND BREAST LIMITED: CPT | Performed by: OBSTETRICS & GYNECOLOGY

## 2023-03-08 NOTE — DISCHARGE INSTRUCTIONS
The Doctor (Radiologist) who performed your procedure was: DR. Sara Riggs an ice pack over the biopsy site on top of your bra or on top of the ACE wrap (never apply ice directly over skin) for 10-15 minutes of every hour until bedtime for your comfort and to decrease bleeding. Keep your sports bra or the ACE wrap (stereotactic and MRI biopsy) in place for 24 hours after your biopsy. This compression decreases bleeding and breast movement for your comfort. Wear a supportive bra for the next couple of days for comfort (sports bra for sleep). Continue to wear, preferably, a sports bra or good supportive bra for 1 week and take off only to shower. No baths or showers during the first 24 hours after biopsy. After this time you may take a shower. It's okay if the strips get wet but do not soak them. NO saunas, hot tubs or swimming until steri-strips fall off (approx. 5 days). This prevents infection and allows time for them to completely close and heal.  DO NOT remove the steri-strips. They will fall off in 5 days. If any type of irritation (redness, itching or blisters) develops in the area around the steri-strips, remove them gently. If the steri-strips do not fall off after 5 days, gently remove them. Keep the area clean and dry. It is normal to have mild discomfort and bruising at the biopsy site. You may take Tylenol as needed for discomfort, as long as you have no allergies to Tylenol. Do not take aspirin, motrin, ibuprofen or any medication containing NSAID (non-steroidal anti-inflammatory drug) product for 48 hours. DO NOT participate in strenuous activity (aerobics, heavy lifting, housework, gardening, etc.) 48 hours after your biopsy to prevent bleeding. You will receive results in 2-3 business days. If you are having an MRI breast biopsy or an Ultrasound guided breast biopsy, you will be billed for the biopsy and unilateral mammogram separately.   If you have any questions about the procedure or your results, please contact the Breast Care Coordinator Nurse at [phone]. Notify your ordering physician or primary physician for increased bleeding, pain or fever over 100. Or contact a Radiology Nurse at [phone] between 8am-4pm (after 4pm, your call will be directed to the [facility] Emergency Room).

## 2023-03-14 ENCOUNTER — HOSPITAL ENCOUNTER (OUTPATIENT)
Dept: ULTRASOUND IMAGING | Facility: HOSPITAL | Age: 48
Discharge: HOME | End: 2023-03-14
Attending: OBSTETRICS & GYNECOLOGY
Payer: COMMERCIAL

## 2023-03-14 ENCOUNTER — HOSPITAL ENCOUNTER (OUTPATIENT)
Dept: MAMMOGRAPHY | Facility: HOSPITAL | Age: 48
Discharge: HOME | End: 2023-03-14
Attending: OBSTETRICS & GYNECOLOGY
Payer: COMMERCIAL

## 2023-03-14 VITALS — RESPIRATION RATE: 18 BRPM | SYSTOLIC BLOOD PRESSURE: 130 MMHG | DIASTOLIC BLOOD PRESSURE: 80 MMHG | HEART RATE: 75 BPM

## 2023-03-14 DIAGNOSIS — N63.20 BREAST MASS, LEFT: ICD-10-CM

## 2023-03-14 PROCEDURE — 88305 TISSUE EXAM BY PATHOLOGIST: CPT

## 2023-03-14 PROCEDURE — 19083 BX BREAST 1ST LESION US IMAG: CPT | Performed by: OBSTETRICS & GYNECOLOGY

## 2023-03-14 PROCEDURE — 77065 DX MAMMO INCL CAD UNI: CPT | Performed by: OBSTETRICS & GYNECOLOGY

## 2023-03-14 NOTE — IMAGING NOTE
mg/dL 05/2018. Recommended statin therapy for primary risk prevention, which I will refer to primary physician,    741 am Received a call from TANIA Hightower that \"Raine Farmer\" had a vagal response during her us bx and to check on pt before they discharge her\" juice and junaid crackers provided     Upon arrival was in [facility] post images completed color pink resp even and unlabored  verbalizes \" I feel good now \"    745 am vss pulse 75 18 resp bp 130/80 denies c/o    755 am Dr Demario Robles informed of vs and status may be discharged . Mala to  dischargept.

## 2023-03-14 NOTE — PROCEDURES
[facility]  Procedure Note    Dani De Leon Patient Status:  Outpatient    10/31/1975 MRN [id number]   Location [address] Attending Adán Cervantes MD   Hosp Day # 0 PCP JUNIOR GARCIA     Procedure: Left breast ultrasound guided biopsy    Pre-Procedure Diagnosis:  Left breast mass    Post-Procedure Diagnosis: Left breast mass    Anesthesia:  Local    Findings:  Left breast mass    Specimens: multiple cores    Blood Loss:  minimal    Tourniquet Time: none  Complications:  None  Drains:  none        Orlin Georges MD  3/14/2023

## 2023-03-15 ENCOUNTER — TELEPHONE (OUTPATIENT)
Dept: ULTRASOUND IMAGING | Facility: HOSPITAL | Age: 48
End: 2023-03-15

## 2023-03-15 NOTE — TELEPHONE ENCOUNTER
Sheri Rivera is s/p biopsy . Phoned and introduced myself as breast coordinator . Reinforced to patient  post biopsy care and instructions . Informed  and shared the pathology results as well as the recommendations from Dr Josep Sanchez for her breast imaging  as follows:      Pathology results shared (see epic for dictated pathology and radiology procedure report)  and recommendations are as follows:Per the 3/7/2023 report, a six-month follow-up bilateral diagnostic mammogram and bilateral breast ultrasound was recommended 612 Jordon Friend acknowledges the above and denies questions. Sheri Rivera  was also instructed to perform breast self exams and if any changes  develops any changes to contact ordering  physician immediately  for re evaluation. .  Brent Arguello understanding and agreement.

## 2023-03-20 RX ORDER — CYCLOBENZAPRINE HCL 5 MG
TABLET ORAL
Qty: 90 TABLET | Refills: 0 | Status: SHIPPED | OUTPATIENT
Start: 2023-03-20

## 2023-03-20 RX ORDER — CYCLOBENZAPRINE HCL 5 MG
TABLET ORAL
Qty: 90 TABLET | Refills: 0 | OUTPATIENT
Start: 2023-03-20

## 2023-09-05 ENCOUNTER — HOSPITAL ENCOUNTER (OUTPATIENT)
Dept: ULTRASOUND IMAGING | Facility: HOSPITAL | Age: 48
Discharge: HOME | End: 2023-09-05
Attending: OBSTETRICS & GYNECOLOGY
Payer: COMMERCIAL

## 2023-09-05 ENCOUNTER — HOSPITAL ENCOUNTER (OUTPATIENT)
Dept: MAMMOGRAPHY | Facility: HOSPITAL | Age: 48
Discharge: HOME | End: 2023-09-05
Attending: OBSTETRICS & GYNECOLOGY
Payer: COMMERCIAL

## 2023-09-05 DIAGNOSIS — R92.8 ABNORMAL MAMMOGRAM: ICD-10-CM

## 2023-09-05 PROCEDURE — 77066 DX MAMMO INCL CAD BI: CPT | Performed by: OBSTETRICS & GYNECOLOGY

## 2023-09-05 PROCEDURE — 77062 BREAST TOMOSYNTHESIS BI: CPT | Performed by: OBSTETRICS & GYNECOLOGY

## 2023-09-05 PROCEDURE — 76642 ULTRASOUND BREAST LIMITED: CPT | Performed by: OBSTETRICS & GYNECOLOGY

## 2023-10-27 ENCOUNTER — LAB ENCOUNTER (OUTPATIENT)
Dept: LAB | Facility: HOSPITAL | Age: 48
End: 2023-10-27
Attending: SPECIALIST

## 2023-10-27 DIAGNOSIS — Z01.812 PRE-OPERATIVE LABORATORY EXAMINATION: Primary | ICD-10-CM

## 2023-10-27 LAB
ALBUMIN SERPL-MCNC: 3.7 G/DL (ref 3.4–5)
ALBUMIN/GLOB SERPL: 0.8 {RATIO} (ref 1–2)
ALP LIVER SERPL-CCNC: 67 U/L
ALT SERPL-CCNC: 37 U/L
ANION GAP SERPL CALC-SCNC: 3 MMOL/L (ref 0–18)
APTT PPP: 23.9 SECONDS (ref 23.3–35.6)
AST SERPL-CCNC: 27 U/L (ref 15–37)
BILIRUB SERPL-MCNC: 0.3 MG/DL (ref 0.1–2)
BUN BLD-MCNC: 17 MG/DL (ref 7–18)
BUN/CREAT SERPL: 19.5 (ref 10–20)
CALCIUM BLD-MCNC: 9.5 MG/DL (ref 8.5–10.1)
CHLORIDE SERPL-SCNC: 108 MMOL/L (ref 98–112)
CO2 SERPL-SCNC: 27 MMOL/L (ref 21–32)
CREAT BLD-MCNC: 0.87 MG/DL
EGFRCR SERPLBLD CKD-EPI 2021: 83 ML/MIN/1.73M2 (ref 60–?)
FASTING STATUS PATIENT QL REPORTED: NO
GLOBULIN PLAS-MCNC: 4.5 G/DL (ref 2.8–4.4)
GLUCOSE BLD-MCNC: 63 MG/DL (ref 70–99)
INR BLD: 0.96 (ref 0.8–1.2)
OSMOLALITY SERPL CALC.SUM OF ELEC: 286 MOSM/KG (ref 275–295)
POTASSIUM SERPL-SCNC: 4.4 MMOL/L (ref 3.5–5.1)
PROT SERPL-MCNC: 8.2 G/DL (ref 6.4–8.2)
PROTHROMBIN TIME: 13.4 SECONDS (ref 11.6–14.8)
SODIUM SERPL-SCNC: 138 MMOL/L (ref 136–145)

## 2023-10-27 PROCEDURE — 85610 PROTHROMBIN TIME: CPT

## 2023-10-27 PROCEDURE — 80053 COMPREHEN METABOLIC PANEL: CPT

## 2023-10-27 PROCEDURE — 36415 COLL VENOUS BLD VENIPUNCTURE: CPT

## 2023-10-27 PROCEDURE — 85730 THROMBOPLASTIN TIME PARTIAL: CPT

## 2023-12-01 RX ORDER — ESTRADIOL 1 MG/1
1 TABLET ORAL DAILY
COMMUNITY

## 2023-12-05 ENCOUNTER — HOSPITAL ENCOUNTER (OUTPATIENT)
Facility: HOSPITAL | Age: 48
Discharge: HOME | End: 2023-12-06
Attending: SPECIALIST | Admitting: SPECIALIST
Payer: COMMERCIAL

## 2023-12-05 ENCOUNTER — ANESTHESIA EVENT (OUTPATIENT)
Dept: SURGERY | Facility: HOSPITAL | Age: 48
End: 2023-12-05
Payer: COMMERCIAL

## 2023-12-05 ENCOUNTER — ANESTHESIA (OUTPATIENT)
Dept: SURGERY | Facility: HOSPITAL | Age: 48
End: 2023-12-05
Payer: COMMERCIAL

## 2023-12-05 PROBLEM — N62 MACROMASTIA: Status: ACTIVE | Noted: 2023-12-05

## 2023-12-05 LAB — B-HCG UR QL: NEGATIVE

## 2023-12-05 PROCEDURE — 0HBV0ZZ EXCISION OF BILATERAL BREAST, OPEN APPROACH: ICD-10-PCS | Performed by: SPECIALIST

## 2023-12-05 PROCEDURE — 81025 URINE PREGNANCY TEST: CPT

## 2023-12-05 PROCEDURE — 88305 TISSUE EXAM BY PATHOLOGIST: CPT | Performed by: SPECIALIST

## 2023-12-05 RX ORDER — MIDAZOLAM HYDROCHLORIDE 1 MG/ML
INJECTION INTRAMUSCULAR; INTRAVENOUS AS NEEDED
Status: DISCONTINUED | OUTPATIENT
Start: 2023-12-05 | End: 2023-12-05 | Stop reason: SURG

## 2023-12-05 RX ORDER — PROCHLORPERAZINE EDISYLATE 5 MG/ML
5 INJECTION INTRAMUSCULAR; INTRAVENOUS EVERY 8 HOURS PRN
Status: DISCONTINUED | OUTPATIENT
Start: 2023-12-05 | End: 2023-12-05 | Stop reason: HOSPADM

## 2023-12-05 RX ORDER — ACETAMINOPHEN 325 MG/1
650 TABLET ORAL EVERY 4 HOURS PRN
Status: DISCONTINUED | OUTPATIENT
Start: 2023-12-05 | End: 2023-12-06

## 2023-12-05 RX ORDER — SCOLOPAMINE TRANSDERMAL SYSTEM 1 MG/1
1 PATCH, EXTENDED RELEASE TRANSDERMAL
Status: DISCONTINUED | OUTPATIENT
Start: 2023-12-05 | End: 2023-12-05 | Stop reason: HOSPADM

## 2023-12-05 RX ORDER — PROCHLORPERAZINE EDISYLATE 5 MG/ML
5 INJECTION INTRAMUSCULAR; INTRAVENOUS EVERY 8 HOURS PRN
Status: DISCONTINUED | OUTPATIENT
Start: 2023-12-05 | End: 2023-12-06

## 2023-12-05 RX ORDER — MORPHINE SULFATE 4 MG/ML
4 INJECTION, SOLUTION INTRAMUSCULAR; INTRAVENOUS EVERY 10 MIN PRN
Status: DISCONTINUED | OUTPATIENT
Start: 2023-12-05 | End: 2023-12-05 | Stop reason: HOSPADM

## 2023-12-05 RX ORDER — MORPHINE SULFATE 2 MG/ML
2 INJECTION, SOLUTION INTRAMUSCULAR; INTRAVENOUS EVERY 2 HOUR PRN
Status: DISCONTINUED | OUTPATIENT
Start: 2023-12-05 | End: 2023-12-06

## 2023-12-05 RX ORDER — MORPHINE SULFATE 4 MG/ML
2 INJECTION, SOLUTION INTRAMUSCULAR; INTRAVENOUS EVERY 10 MIN PRN
Status: DISCONTINUED | OUTPATIENT
Start: 2023-12-05 | End: 2023-12-05 | Stop reason: HOSPADM

## 2023-12-05 RX ORDER — CEFAZOLIN SODIUM/WATER 2 G/20 ML
2 SYRINGE (ML) INTRAVENOUS ONCE
Status: COMPLETED | OUTPATIENT
Start: 2023-12-05 | End: 2023-12-05

## 2023-12-05 RX ORDER — DEXTROSE, SODIUM CHLORIDE, SODIUM LACTATE, POTASSIUM CHLORIDE, AND CALCIUM CHLORIDE 5; .6; .31; .03; .02 G/100ML; G/100ML; G/100ML; G/100ML; G/100ML
INJECTION, SOLUTION INTRAVENOUS CONTINUOUS
Status: DISCONTINUED | OUTPATIENT
Start: 2023-12-05 | End: 2023-12-06

## 2023-12-05 RX ORDER — FAMOTIDINE 10 MG/ML
20 INJECTION, SOLUTION INTRAVENOUS ONCE
Status: COMPLETED | OUTPATIENT
Start: 2023-12-05 | End: 2023-12-05

## 2023-12-05 RX ORDER — BUPIVACAINE HYDROCHLORIDE 2.5 MG/ML
INJECTION, SOLUTION EPIDURAL; INFILTRATION; INTRACAUDAL AS NEEDED
Status: DISCONTINUED | OUTPATIENT
Start: 2023-12-05 | End: 2023-12-05 | Stop reason: HOSPADM

## 2023-12-05 RX ORDER — ONDANSETRON 2 MG/ML
4 INJECTION INTRAMUSCULAR; INTRAVENOUS EVERY 6 HOURS PRN
Status: DISCONTINUED | OUTPATIENT
Start: 2023-12-05 | End: 2023-12-06

## 2023-12-05 RX ORDER — ONDANSETRON 2 MG/ML
INJECTION INTRAMUSCULAR; INTRAVENOUS AS NEEDED
Status: DISCONTINUED | OUTPATIENT
Start: 2023-12-05 | End: 2023-12-05 | Stop reason: SURG

## 2023-12-05 RX ORDER — HYDROMORPHONE HYDROCHLORIDE 1 MG/ML
0.6 INJECTION, SOLUTION INTRAMUSCULAR; INTRAVENOUS; SUBCUTANEOUS EVERY 5 MIN PRN
Status: DISCONTINUED | OUTPATIENT
Start: 2023-12-05 | End: 2023-12-05 | Stop reason: HOSPADM

## 2023-12-05 RX ORDER — CEFAZOLIN SODIUM/WATER 2 G/20 ML
2 SYRINGE (ML) INTRAVENOUS EVERY 8 HOURS
Qty: 40 ML | Refills: 0 | Status: COMPLETED | OUTPATIENT
Start: 2023-12-05 | End: 2023-12-06

## 2023-12-05 RX ORDER — LIDOCAINE HYDROCHLORIDE 10 MG/ML
INJECTION, SOLUTION EPIDURAL; INFILTRATION; INTRACAUDAL; PERINEURAL AS NEEDED
Status: DISCONTINUED | OUTPATIENT
Start: 2023-12-05 | End: 2023-12-05 | Stop reason: SURG

## 2023-12-05 RX ORDER — TEMAZEPAM 15 MG/1
15 CAPSULE ORAL NIGHTLY PRN
Status: DISCONTINUED | OUTPATIENT
Start: 2023-12-05 | End: 2023-12-06

## 2023-12-05 RX ORDER — MORPHINE SULFATE 2 MG/ML
1 INJECTION, SOLUTION INTRAMUSCULAR; INTRAVENOUS EVERY 2 HOUR PRN
Status: DISCONTINUED | OUTPATIENT
Start: 2023-12-05 | End: 2023-12-06

## 2023-12-05 RX ORDER — HYDROCODONE BITARTRATE AND ACETAMINOPHEN 5; 325 MG/1; MG/1
2 TABLET ORAL EVERY 4 HOURS PRN
Status: DISCONTINUED | OUTPATIENT
Start: 2023-12-05 | End: 2023-12-06

## 2023-12-05 RX ORDER — MORPHINE SULFATE 4 MG/ML
4 INJECTION, SOLUTION INTRAMUSCULAR; INTRAVENOUS EVERY 2 HOUR PRN
Status: DISCONTINUED | OUTPATIENT
Start: 2023-12-05 | End: 2023-12-06

## 2023-12-05 RX ORDER — SODIUM CHLORIDE, SODIUM LACTATE, POTASSIUM CHLORIDE, CALCIUM CHLORIDE 600; 310; 30; 20 MG/100ML; MG/100ML; MG/100ML; MG/100ML
INJECTION, SOLUTION INTRAVENOUS CONTINUOUS
Status: DISCONTINUED | OUTPATIENT
Start: 2023-12-05 | End: 2023-12-05

## 2023-12-05 RX ORDER — HYDROMORPHONE HYDROCHLORIDE 1 MG/ML
0.2 INJECTION, SOLUTION INTRAMUSCULAR; INTRAVENOUS; SUBCUTANEOUS EVERY 5 MIN PRN
Status: DISCONTINUED | OUTPATIENT
Start: 2023-12-05 | End: 2023-12-05 | Stop reason: HOSPADM

## 2023-12-05 RX ORDER — ONDANSETRON 2 MG/ML
4 INJECTION INTRAMUSCULAR; INTRAVENOUS EVERY 6 HOURS PRN
Status: DISCONTINUED | OUTPATIENT
Start: 2023-12-05 | End: 2023-12-05 | Stop reason: HOSPADM

## 2023-12-05 RX ORDER — SODIUM CHLORIDE, SODIUM LACTATE, POTASSIUM CHLORIDE, CALCIUM CHLORIDE 600; 310; 30; 20 MG/100ML; MG/100ML; MG/100ML; MG/100ML
INJECTION, SOLUTION INTRAVENOUS CONTINUOUS
Status: DISCONTINUED | OUTPATIENT
Start: 2023-12-05 | End: 2023-12-05 | Stop reason: HOSPADM

## 2023-12-05 RX ORDER — DEXAMETHASONE SODIUM PHOSPHATE 4 MG/ML
VIAL (ML) INJECTION AS NEEDED
Status: DISCONTINUED | OUTPATIENT
Start: 2023-12-05 | End: 2023-12-05 | Stop reason: SURG

## 2023-12-05 RX ORDER — HYDROMORPHONE HYDROCHLORIDE 1 MG/ML
0.4 INJECTION, SOLUTION INTRAMUSCULAR; INTRAVENOUS; SUBCUTANEOUS EVERY 5 MIN PRN
Status: DISCONTINUED | OUTPATIENT
Start: 2023-12-05 | End: 2023-12-05 | Stop reason: HOSPADM

## 2023-12-05 RX ORDER — MORPHINE SULFATE 10 MG/ML
6 INJECTION, SOLUTION INTRAMUSCULAR; INTRAVENOUS EVERY 10 MIN PRN
Status: DISCONTINUED | OUTPATIENT
Start: 2023-12-05 | End: 2023-12-05 | Stop reason: HOSPADM

## 2023-12-05 RX ORDER — NALOXONE HYDROCHLORIDE 0.4 MG/ML
0.08 INJECTION, SOLUTION INTRAMUSCULAR; INTRAVENOUS; SUBCUTANEOUS AS NEEDED
Status: DISCONTINUED | OUTPATIENT
Start: 2023-12-05 | End: 2023-12-05 | Stop reason: HOSPADM

## 2023-12-05 RX ORDER — HYDROCODONE BITARTRATE AND ACETAMINOPHEN 5; 325 MG/1; MG/1
1 TABLET ORAL EVERY 4 HOURS PRN
Status: DISCONTINUED | OUTPATIENT
Start: 2023-12-05 | End: 2023-12-06

## 2023-12-05 RX ORDER — FAMOTIDINE 20 MG/1
20 TABLET, FILM COATED ORAL ONCE
Status: COMPLETED | OUTPATIENT
Start: 2023-12-05 | End: 2023-12-05

## 2023-12-05 RX ORDER — ACETAMINOPHEN 500 MG
1000 TABLET ORAL ONCE
Status: DISCONTINUED | OUTPATIENT
Start: 2023-12-05 | End: 2023-12-05 | Stop reason: HOSPADM

## 2023-12-05 RX ADMIN — MIDAZOLAM HYDROCHLORIDE 2 MG: 1 INJECTION INTRAMUSCULAR; INTRAVENOUS at 12:43:00

## 2023-12-05 RX ADMIN — LIDOCAINE HYDROCHLORIDE 50 MG: 10 INJECTION, SOLUTION EPIDURAL; INFILTRATION; INTRACAUDAL; PERINEURAL at 12:43:00

## 2023-12-05 RX ADMIN — CEFAZOLIN SODIUM/WATER 2 G: 2 G/20 ML SYRINGE (ML) INTRAVENOUS at 12:48:00

## 2023-12-05 RX ADMIN — DEXAMETHASONE SODIUM PHOSPHATE 4 MG: 4 MG/ML VIAL (ML) INJECTION at 12:43:00

## 2023-12-05 RX ADMIN — ONDANSETRON 4 MG: 2 INJECTION INTRAMUSCULAR; INTRAVENOUS at 12:43:00

## 2023-12-05 NOTE — DISCHARGE INSTRUCTIONS
KAN drain care as instructed - empty, record and strip KAN drains as needed to keep a kink in the KAN bulb. Please reinforce KAN drain sites with 4x4 gauze as needed if there is drainage from under the clear plastic adhesive dressing. Do not remove any plastic adhesive dressings covering KAN drain skin sites or pain pump sites. May remove garment briefly if needed, then replace promptly. Begin antibiotics tonight as instructed; Pain medication as needed for pain. Norco or Tylenol as needed for pain; May start OTC NSAID (Advil or Ibuprofen) 48 hours after surgery for pain; Ambulate and deep breathe regularly. Hold on shower until pain pumps and KAN drains removed. May sponge bathe as needed. Return to clinic as scheduled.

## 2023-12-05 NOTE — DISCHARGE SUMMARY
[facility]    Discharge Summary    Andrea Roberts Patient Status:  Outpatient in a Bed    10/31/1975 MRN [id number]   Location [address] [facility] Attending Xin Woodruff MD   Hosp Day # 0 PCP Daivd Proper     Date of Admission: 2023   Date of Discharge: 2023    Admitting Diagnosis: Macromastia, hypertrophy of breasts    Disposition: Discharge to home    Discharge Diagnosis: macromastia      Hospital Course:   Reason for Admission: Macromastia, hypertrophy of breasts      Surgical Procedures       Case IDs Date Procedure Surgeon Location Status    [id number] 23 Bilateral breast reduction with GIUSEPPE drain and pain pump placement MD Harry Halljc [address] Course: Uncomplicated    Complications: None        Discharge Plan:   Discharge Condition:Good    Current Discharge Medication List        Home Meds - Unchanged    Details   estradiol 1 MG Oral Tab Take 1 tablet (1 mg total) by mouth daily. Ascorbic Acid (VITAMIN C OR) Take 1 tablet by mouth daily. triamcinolone 0.5 % External Ointment Apply 1 Application topically twice a week. escitalopram 10 MG Oral Tab Take 1 tablet (10 mg total) by mouth daily. Multiple Vitamins-Minerals (MULTIVITAL) Oral Tab Take 1 tablet by mouth daily. cyclobenzaprine 5 MG Oral Tab Take one Q HS. Discharge Diet: Low sodium diet (1500mg/day)    Discharge Activity: As tolerated    Follow up: Follow-up Information       Xin Woodruff MD Follow up in 1 day(s). Specialty: SURGERY, PLASTIC  Why: Giuseppe drain and pain pump removal  Contact information:  [place]  [phone]                                 Other Discharge Instructions:         GIUSEPPE drain care as instructed - empty, record and strip GIUSEPPE drains as needed to keep a kink in the GIUSEPPE bulb.   Please reinforce GIUSEPPE drain sites with 4x4 gauze as needed if there is drainage from under the clear plastic adhesive dressing. Do not remove any plastic adhesive dressings covering KAN drain skin sites or pain pump sites. May remove garment briefly if needed, then replace promptly. Begin antibiotics tonight as instructed; Pain medication as needed for pain. Norco or Tylenol as needed for pain; May start OTC NSAID (Advil or Ibuprofen) 48 hours after surgery for pain; Ambulate and deep breathe regularly. Hold on shower until pain pumps and KAN drains removed. May sponge bathe as needed. Return to clinic as scheduled.          Kar Kraft MD  12/5/2023

## 2023-12-05 NOTE — BRIEF OP NOTE
Pre-Operative Diagnosis: Macromastia, hypertrophy of breasts     Post-Operative Diagnosis: Macromastia, hypertrophy of breasts      Procedure Performed:   Bilateral breast reduction with KAN drain and pain pump placement    Surgeon(s) and Role:     Carrie Sanders MD - Primary    Assistant(s):  Surgical Assistant.: Juliana Park     Surgical Findings: Nothing unusual     Specimen: R and L breast tissue     Estimated Blood Loss: Blood Output: 100 mL (12/5/2023  2:44 PM)      Dictation Number:  Dictated    Royal Pan MD  12/5/2023  2:55 PM

## 2023-12-05 NOTE — H&P
History & Physical Examination    Patient Name: Harriet [place]  MRN: [id number]  CSN: [id number]  YOB: 1975    Diagnosis: Macromastia    Present Illness: 54 yo presents with complaints of macromastia and requests a bilateral breast reduction with Giuseppe drain and pain pump placement. All risks and benefits and alternatives to surgery discussed and questions answered in detail and patient consents to proceed. Medications Prior to Admission   Medication Sig Dispense Refill Last Dose    estradiol 1 MG Oral Tab Take 1 tablet (1 mg total) by mouth daily. 12/5/2023 at 0800    Ascorbic Acid (VITAMIN C OR) Take 1 tablet by mouth daily. 12/4/2023 at 0730    triamcinolone 0.5 % External Ointment Apply 1 Application topically twice a week. 45 g 3 Past Week    escitalopram 10 MG Oral Tab Take 1 tablet (10 mg total) by mouth daily. 12/5/2023 at 0800    Multiple Vitamins-Minerals (MULTIVITAL) Oral Tab Take 1 tablet by mouth daily. 12/4/2023 at 0730    cyclobenzaprine 5 MG Oral Tab Take one Q HS.  90 tablet 0 More than a month     Current Facility-Administered Medications   Medication Dose Route Frequency    lactated ringers infusion   Intravenous Continuous    acetaminophen (Tylenol Extra Strength) tab 1,000 mg  1,000 mg Oral Once    ceFAZolin (Ancef) 2 g in 20mL IV syringe premix  2 g Intravenous Once    scopolamine (Transderm-Scop) 1 MG/3DAYS patch 1 patch  1 patch Transdermal Q72H       Allergies: No Known Allergies    Past Medical History:   Diagnosis Date    Attention deficit hyperactivity disorder (ADHD)     Depression     Fibromyalgia     Hx of motion sickness     PONV (postoperative nausea and vomiting)     Sjogren's disease (HCC)     Visual impairment     glasses     Past Surgical History:   Procedure Laterality Date    D & C      NEEDLE BIOPSY LEFT       Family History   Problem Relation Age of Onset    Breast Cancer Mother 66    Hypertension Mother     Heart Attack Mother 46    Bipolar Disorder Mother     Hypertension Father     Hypertension Brother     Breast Cancer Maternal Cousin Female         35s     Social History     Tobacco Use    Smoking status: Never    Smokeless tobacco: Never   Substance Use Topics    Alcohol use: Not Currently       SYSTEM Check if Review is Normal Check if Physical Exam is Abnormal If not normal, please explain:   HEENT [x ] [ ]    NECK & BACK [x ] [ ]    HEART [x ] [ ]    LUNGS [x ] [ ]    ABDOMEN [ x] [ ]    Jamas Eis [ x] [ ]    EXTREMITIES [ x] [ ]    Rae Morales [ ] [x ] macromastia   OTHER        [ x ] I have discussed the risks and benefits and alternatives with the patient/family. [ x ] The above referenced H&P was reviewed by Ronna Lantigua MD on 12/5/2023 the patient was examined and no significant changes have occurred in the patient's condition since the H&P was performed. I discussed with the patient and/or legal representative the potential benefits, risks and side effects of this procedure; the likelihood of the patient achieving goals; and potential problems that might occur during recuperation. I discussed reasonable alternatives to the procedure, including risks, benefits and side effects related to the alternatives and risks related to not receiving this procedure. We will proceed with procedure as planned. All questions have been answered in detail and they understand and agree to proceed with plan of care. [ x ] I have reviewed the History and Physical done within the last 30 days. Any changes noted above.     Flakito Stallings MD  12/5/2023  12:13 PM

## 2023-12-05 NOTE — ANESTHESIA PROCEDURE NOTES
Airway  Date/Time: 12/5/2023 12:48 PM  Urgency: Elective      General Information and Staff    Patient location during procedure: OR  Anesthesiologist: Cecy Britt MD  Resident/CRNA: Cherelle White CRNA  Performed: anesthesiologist   Performed by: Cherelle White CRNA  Authorized by: Cecy Britt MD      Indications and Patient Condition  Indications for airway management: anesthesia  Sedation level: deep  Preoxygenated: yes  Patient position: sniffing  Mask difficulty assessment: 1 - vent by mask    Final Airway Details  Final airway type: endotracheal airway      Successful airway: ETT  Cuffed: yes   Successful intubation technique: direct laryngoscopy  Facilitating devices/methods: intubating stylet  Endotracheal tube insertion site: oral  Blade: Brian  ETT size (mm): 7.0    Cormack-Lehane Classification: grade I - full view of glottis  Placement verified by: capnometry   Measured from: teeth  Number of attempts at approach: 1

## 2023-12-05 NOTE — ANESTHESIA POSTPROCEDURE EVALUATION
Patient: Tati Combs    Procedure Summary       Date: 12/05/23 Room / Location: [facility] OR  / [facility] MAIN OR    Anesthesia Start: 2684 Anesthesia Stop: 7298    Procedure: Bilateral breast reduction with KAN drain and pain pump placement (Bilateral: Breast) Diagnosis: (Macromastia, hypertrophy of breasts)    Surgeons: Sepideh Merritt MD Anesthesiologist: Taj Leiva MD    Anesthesia Type: general ASA Status: 2            Anesthesia Type: general    Vitals Value Taken Time   /78 12/05/23 1522   Temp  12/05/23 1522   Pulse 95 12/05/23 1522   Resp 15 12/05/23 1522   SpO2 92 % 12/05/23 1522   Vitals shown include unfiled device data.     [facility] Post Evaluation:   Patient Evaluated in PACU  Patient Participation: complete - patient participated  Level of Consciousness: awake  Pain Management: adequate  Airway Patency:patent  Yes    Cardiovascular Status: acceptable  Respiratory Status: acceptable  Postoperative Hydration acceptable      Janine Amin MD  12/5/2023 3:22 PM

## 2023-12-06 VITALS
TEMPERATURE: 98 F | HEART RATE: 66 BPM | WEIGHT: 223 LBS | DIASTOLIC BLOOD PRESSURE: 60 MMHG | OXYGEN SATURATION: 96 % | RESPIRATION RATE: 18 BRPM | SYSTOLIC BLOOD PRESSURE: 103 MMHG | HEIGHT: 64 IN | BODY MASS INDEX: 38.07 KG/M2

## 2023-12-06 PROBLEM — N62 MACROMASTIA: Status: RESOLVED | Noted: 2023-12-05 | Resolved: 2023-12-06

## 2023-12-06 NOTE — OPERATIVE REPORT
[facility]    PATIENT'S NAME: Hermelinda Woods   ATTENDING PHYSICIAN: Denisa Persaud MD   OPERATING PHYSICIAN: Denisa Hernandez MD   PATIENT ACCOUNT#:   [id number]    LOCATION:   Room 8 A [facility]  MEDICAL RECORD #:   [id number]       YOB: 1975  ADMISSION DATE:       12/05/2023      OPERATION DATE:  12/05/2023    OPERATIVE REPORT      PREOPERATIVE DIAGNOSIS:  Bilateral symptomatic macromastia. POSTOPERATIVE DIAGNOSIS:  Bilateral symptomatic macromastia. PROCEDURE:  Bilateral breast reduction with Naun-Gonzalez drain and pain pump placement. ASSISTANT:  Shruthi Joseph CSA. ANESTHESIA:  General endotracheal anesthesia. ESTIMATED BLOOD LOSS:  100 mL. INTRAVENOUS FLUIDS:  2000 mL of crystalloid. COMPLICATIONS:  None. DRAINS PLACED:  One 7 mm flat KAN drain in each lateral breast pocket and 1 On-Q pain pump catheter in each breast pocket for postop pain control. SPECIMEN REMOVED:  Right breast 1003 g, left breast 1056 g. DISPOSITION:  Patient tolerated the procedure well, was awakened from anesthesia, and transferred to recovery room in good condition. INDICATIONS:  This is a 73-year-old patient who presented to my office with complaints of bilateral symptomatic macromastia. She requested a bilateral breast reduction procedure. We discussed the operation in detail at 2 separate preoperative visits.   She was made aware of the risks of bleeding, infection, scarring, asymmetry, contour irregularities, wound dehiscence with the need for secondary wound healing, skin flap necrosis with the need for secondary wound healing, nipple necrosis with the need for secondary wound healing, skin flap sensory loss that can be permanent in nature, nipple-areolar complex sensory loss that can be permanent in nature, no guarantee on final breast cup size, use of KAN drains and pain pumps with the operation, need for revisional surgery, overall dissatisfaction with final aesthetic outcome, and the rare but possible risk of DVT, PE, MI, COVID-19 infection, sepsis, and death and, understanding these risks and limitations, she wished to proceed. I answered all her questions in detail regarding the procedure at 2 separate preoperative visits and again in the preop holding area. Once all questions were answered and consents obtained, the procedure went as follows. OPERATIVE TECHNIQUE:  Patient was taken to the operating room, placed on the table in supine position. General endotracheal anesthesia was induced without any complications. The anterior chest wall area was prepped and draped in normal sterile fashion. At the beginning of the procedure, approximately 300 mL of tumescent solution was infiltrated around the periphery of each breast pedicle. At this point, the 50 mm cookie cutter was used to imprint the nipple-areolar complex on each side, and the skin was deepithelialized between the edge of the nipple-areolar complex and the edge of the pedicle on each side. At this point, the incisions around the periphery of the pedicles were deepened with a 10 blade for a distance of approximately 1.5 cm and the skin flaps were elevated off the underlying breast gland, keeping a thickness of approximately 1.5 cm first medially, then superiorly, and then laterally. At this point, the breast glands were debulked in appropriate size for the patient's body habitus and as per insurance requirements with a minimum of 1000 g of breast tissue be removed from each breast.  The skin flaps were brought down over the reduced glands, secured at a point 13 cm off the midline along each inframammary fold, and the medial and lateral dog ears were marked, incised sharply, and removed. Skin flaps were reopened. Bipolar was used for hemostasis throughout.   The areas were irrigated multiple times with warm antibiotic irrigation as well with Betadine and, once again, bipolar was again used for hemostasis throughout. Stab incisions were made laterally for placement of 7 mm flat KAN drains and On-Q pain pump catheters were placed into the breast pockets as well. At this point, the pedicles were tacked to the anterior chest wall with 2 interrupted sutures of 2-0 PDS, 1 medial and 1 lateral, and then the skin flaps were brought down and sewn in place with 2-0 Vicryl and 4-0 Prolene. The patient was seated into an upright sitting position and found to have excellent breast shape and symmetry. The new positions for the nipple-areolar complexes were marked with the 42 mm cookie cutter and measured for symmetry from both the sternal notch and midline. Once symmetry was ensured, the markings were incised, the skin and underlying subcutaneous tissue was removed sharply. Bipolar was used for hemostasis along the fresh incisional edge and then the original nipple-areolar complexes were pink and viable with no evidence of venous congestion or ischemia, were brought out, and sewn in place with 3-0 Vicryl and 4-0 Prolene. Mastisol and Steri-Strips were applied along the incision line. Mastisol, Steris, Biopatches, and Tegaderm were used to secure the KAN drain and pain pumps. The pain pumps were bolused with 10 mL of 0.5% Marcaine on each side. The drains were placed on bulb suction. The patient was then placed in support bra, awakened from anesthesia, and transferred to recovery room in good condition. Dictated By Sunita Persaud MD  d: 12/05/2023 15:02:38  t: 12/05/2023 18:23:35  [facility] [id number]  [facility]/

## 2024-12-09 ENCOUNTER — OFFICE VISIT (OUTPATIENT)
Dept: RHEUMATOLOGY | Facility: CLINIC | Age: 49
End: 2024-12-09
Payer: COMMERCIAL

## 2024-12-09 ENCOUNTER — LAB ENCOUNTER (OUTPATIENT)
Dept: LAB | Facility: HOSPITAL | Age: 49
End: 2024-12-09
Attending: INTERNAL MEDICINE
Payer: COMMERCIAL

## 2024-12-09 ENCOUNTER — HOSPITAL ENCOUNTER (OUTPATIENT)
Dept: GENERAL RADIOLOGY | Facility: HOSPITAL | Age: 49
Discharge: HOME | End: 2024-12-09
Attending: INTERNAL MEDICINE
Payer: COMMERCIAL

## 2024-12-09 VITALS
BODY MASS INDEX: 35.31 KG/M2 | WEIGHT: 206.81 LBS | SYSTOLIC BLOOD PRESSURE: 115 MMHG | HEART RATE: 77 BPM | HEIGHT: 64 IN | DIASTOLIC BLOOD PRESSURE: 84 MMHG | RESPIRATION RATE: 16 BRPM

## 2024-12-09 DIAGNOSIS — R35.0 FREQUENCY OF URINATION AND POLYURIA: ICD-10-CM

## 2024-12-09 DIAGNOSIS — M35.00 SJOGREN'S SYNDROME, WITH UNSPECIFIED ORGAN INVOLVEMENT (HCC): Primary | ICD-10-CM

## 2024-12-09 DIAGNOSIS — R35.89 FREQUENCY OF URINATION AND POLYURIA: ICD-10-CM

## 2024-12-09 DIAGNOSIS — R09.1 PLEURISY: ICD-10-CM

## 2024-12-09 DIAGNOSIS — M25.50 POLYARTHRALGIA: ICD-10-CM

## 2024-12-09 DIAGNOSIS — M35.00 SJOGREN'S SYNDROME, WITH UNSPECIFIED ORGAN INVOLVEMENT (HCC): ICD-10-CM

## 2024-12-09 DIAGNOSIS — M79.10 MYALGIA: ICD-10-CM

## 2024-12-09 PROCEDURE — 71046 X-RAY EXAM CHEST 2 VIEWS: CPT | Performed by: INTERNAL MEDICINE

## 2024-12-09 PROCEDURE — 99214 OFFICE O/P EST MOD 30 MIN: CPT | Performed by: INTERNAL MEDICINE

## 2024-12-09 RX ORDER — PREDNISONE 5 MG/1
TABLET ORAL
Qty: 21 TABLET | Refills: 0 | Status: SHIPPED | OUTPATIENT
Start: 2024-12-09

## 2024-12-09 NOTE — PROGRESS NOTES
Raine Farmer is a 49 year old female who presents for   Chief Complaint   Patient presents with    Consult     Sjogren's syndrome, with unspecified organ involvement (HCC)    Joint Pain     Hands, fingers, toes and knees   .   HPI:     I had the pleasure of seeing Raine Farmer on 12/9/2024 for evaluation.     She is a pleasant 49 year old who is re-establishing care. She has Sjogren's Syndrome, with SSA antibody and positive JOSE ANTONIO.  And fibromyalgia. She was seeing Dr. Simental in the past, rheumatology.   She was last seen in 6/27/2022. She was referred by Dr. Bailey.   She was treated with cyclobenzaprine 5mg at bedtime in the past. Which had been helping her.   She didn't have a rash.  She used mouthwash and systane drops for her sicca sx.      She had elevated liver tests in June of 2016.  Her hepatitis B core IgM antibody was positive in June 2016.  Her itching went away, and her liver tests return to normal.     She is here today on 12/9/2024  She had a stressful year last year - her mom passed.   Her hands have started hurting or several months.   She feels her hands might be a little swollen.   All her joints are way more painful now. Her pain level is 6-7/10 pain - her muscles in arms.    Pain increased in shoulders, elbows, back. , knees, ankles and toes.   Not so much in hips.   She's lifting weight for past couple of years. She does lift 25 -30lbs pound weights -   She's feels also she is getting a rash on her cheeks intermittently. It's worse in the sun. It s was worse this summer. She never had this before.     Advil is not helping.     She has dry eyes and dry mouth.   She tried restasis. She went to eye doctor , dr. Combs in [place] for opthalmolgoist.   No dental issues.     She was on cyclobenzaprine 5mg prn - she started taking it again for pain in the last couple of weeks. This helps her sleep and that' shelping.   Not helping with joint pain.     No swelling of glands.   Does get a  little bit more often kanker sores.     Yesterday she started having trouble breathing - her chest was hurting and thought she should goto the ER.   Was pleurisy -   No fevers.   Never had that before.     No numbness and tignling.   No hx of psoraisis.   She's peeing a lot more than before.   She's just turned 49.   She gets tension headaches , not migraines.   No Raynaud's.   Gets heart burn.       Wt Readings from Last 2 Encounters:   12/09/24 206 lb 12.8 oz (93.8 kg)   12/05/23 223 lb (101.2 kg)     Body mass index is 35.5 kg/m².      Current Outpatient Medications   Medication Sig Dispense Refill    Ascorbic Acid (VITAMIN C OR) Take 1 tablet by mouth daily.      cyclobenzaprine 5 MG Oral Tab Take one Q HS. 90 tablet 0    escitalopram 10 MG Oral Tab Take 1 tablet (10 mg total) by mouth daily.      Multiple Vitamins-Minerals (MULTIVITAL) Oral Tab Take 1 tablet by mouth daily.        Past Medical History:    Attention deficit hyperactivity disorder (ADHD)    Depression    Fibromyalgia    Hx of motion sickness    PONV (postoperative nausea and vomiting)    Sjogren's disease (HCC)    Visual impairment    glasses      Past Surgical History:   Procedure Laterality Date    D & c      Needle biopsy left      Reduction of large breast Bilateral 12/05/2023      Family History   Problem Relation Age of Onset    Breast Cancer Mother 78    Hypertension Mother     Heart Attack Mother 52    Bipolar Disorder Mother     Hypertension Father     Hypertension Brother     Breast Cancer Maternal Cousin Female         30s      Social History:  Social History     Socioeconomic History    Marital status: Single   Tobacco Use    Smoking status: Never    Smokeless tobacco: Never   Vaping Use    Vaping status: Never Used   Substance and Sexual Activity    Alcohol use: Not Currently    Drug use: No    Sexual activity: Not Currently     Social Drivers of Health     Food Insecurity: No Food Insecurity (12/5/2023)    Food Insecurity     Food  Insecurity: Never true   Transportation Needs: No Transportation Needs (12/5/2023)    Transportation Needs     Lack of Transportation: No   Housing Stability: Low Risk  (12/5/2023)    Housing Stability     Housing Instability: No           REVIEW OF SYSTEMS:   Review Of Systems:  Fatigue  Constitutional:No fever, no change in weight or appetitie  Derm: No rashes, no oral ulcers, no alopecia, no photosensitivity, no psoriasis  HEENT: No dry eyes, no dry mouth, no Raynaud's, no nasal ulcers, no parotid swelling, no neck pain, no jaw pain, no temple pain  Eyes: No visual changes,   CVS: No chest pain, no heart disease  RS: No SOB, no Cough, No Pleurtic pain,   GI: No nausea, no vomiiting, no abominal pain, no hx of ulcer, no gastritis, no heartburn, no dysphagia, no BRBPR or melena  : no dysuria, no hx of miscarriages, no DVT Hx, no hx of OCP,   Neuro: No numbness or tingling, no headache, no hx of seizures,   Psych: no hx of anxiety or depression  ENDO: no hx of thyroid disease, no hx of DM  Joint/Muscluskeltal: see HPI,   All other ROS are negative.     EXAM:   /84   Pulse 77   Resp 16   Ht 5' 4\" (1.626 m)   Wt 206 lb 12.8 oz (93.8 kg)   BMI 35.50 kg/m²   HEENT: Clear oropharynx, no oral ulcers, EOM intact, clear sclera, PERRLA, pleasant, no acute distress, no CAD,   No rashes  CVS: RRR, no murmurs  RS: CTAB, no crackles, no rhonchi, no sternal tenderness  ABD: Soft Non tender, no HSM felt, BS positive  Joint exam:   no neck tendnerness, good ROM,   Diffuse tender points romina in arms   No synovitis seen in hands   Tender in 1-5th pips , can close both hands.   EXTREMITIES: no cyanosis, clubbing or edema  NEURO: intact touch, 5/5 ue and le strength    Component      Latest Ref Rng 6/27/2022   PROTEIN, TOTAL      6.4 - 8.2 g/dL 7.7    Albumin      3.75 - 5.21 g/dL 4.30    ALPHA-1-GLOBULINS      0.19 - 0.46 g/dL 0.25    ALPHA-2-GLOBULINS      0.48 - 1.05 g/dL 0.74    BETA GLOBULINS      0.68 - 1.23 g/dL 1.12     GAMMA GLOBULINS      0.62 - 1.70 g/dL 1.29    ALBUMIN/GLOBULIN RATIO      1.00 - 2.00  1.26    SPE INTERPRETATION Serum protein electrophoresis shows no evidence of significant abnormalities.    Reviewed By: Teddy Galaviz M.D.    COMPLEMENT C3      90.0 - 180.0 mg/dL 117.0    COMPLEMENT C4      10.0 - 40.0 mg/dL 31.8    SED RATE      0 - 20 mm/Hr      8/12/2023 -   JOSE ANTONIO 1:640     3/28/2024  Wbc is 6.1, hb i13.2, plta re 269,   Crp si 7.0mg/dL,   Cmp - cr ia 0.78, ast is 22, alt is 18, , hba 1c is 5.5,   ASSESSMENT AND PLAN:   Raine Farmer is a 49 year old female who presents for   Chief Complaint   Patient presents with    Consult     Sjogren's syndrome, with unspecified organ involvement (HCC)    Joint Pain     Hands, fingers, toes and knees     Sjogrens positive SSA - - re-establishing care - seems like she is having a flare up   Check labs to evaluate for inflammatory arthritis and/or connective tissue disease   Start Prendisone 5mg - Take 6 tabsx 1 day, take 5 tabsx 1 day, take 4 tabsx 1 day,take 3 tabsx 1 day, take 2 tabsx 1 day, take 1 tabsx 1 day, then off  Will monitor her symptoms   Rtc in 3months.     2. Fibromyaliga - overlap - on cyclobnezparine     3. Pleurisy - flareof her sjogrnes - check chest xray - eval for effusion or not and rule out any chronic ILD as well     5.. History of cholestasis and elevated liver tests. Her hepatitis B core IgM antibody was positive June of 2016. Her liver tests returned to normal. Sjogren's can be associated with histologic changes of primary biliary cirrhosis. There can be lab abnormalities, usually mild in Sjogren's. There can be autoimmune hepatitis. It can be hepatocellular or primarily cholestasis.  She had another episode with itching in March of 2017, likely from birth control pills.  She has been off BCPs since, without recurrence.     Summary:  1 check labs   2. Start Prendisone 5mg - Take 6 tabsx 1 day, take 5 tabsx 1 day, take 4 tabsx 1 day,take 3  tabsx 1 day, take 2 tabsx 1 day, take 1 tabsx 1 day, then off  3. Return to clinic in 3months.   4. Chest xray - , flare of  sjogrens - rule out pleurisy related effusion    Maren Burch MD  12/9/2024  12:31 PM    Addendum:  Component      Latest Ref Rng 12/9/2024   Glucose      65 - 139 mg/dL 83    BUN      7 - 25 mg/dL 15    CREATININE      0.50 - 0.99 mg/dL 0.73    EGFR      > OR = 60 mL/min/1.73m2 101    BUN/CREATININE RATIO      6 - 22 (calc) SEE NOTE:    Sodium      135 - 146 mmol/L 138    Potassium      3.5 - 5.3 mmol/L 4.1    Chloride      98 - 110 mmol/L 102    Carbon Dioxide, Total      20 - 32 mmol/L 24    CALCIUM      8.6 - 10.2 mg/dL 9.3    PROTEIN, TOTAL      6.1 - 8.1 g/dL 7.4    Albumin      3.6 - 5.1 g/dL 4.1    Globulin      1.9 - 3.7 g/dL (calc) 3.3    A/G Ratio      1.0 - 2.5 (calc) 1.2    Total Bilirubin      0.2 - 1.2 mg/dL 0.3    Alkaline Phosphatase      31 - 125 U/L 66    AST (SGOT)      10 - 35 U/L 27    ALT (SGPT)      6 - 29 U/L 27    Color Urine      YELLOW  YELLOW    APPEARANCE      CLEAR  CLEAR    SPECIFIC GRAVITY      1.001 - 1.035  1.013    PH      5.0 - 8.0  6.0    GLUCOSE      NEGATIVE  NEGATIVE    Bilirubin Urine      NEGATIVE  NEGATIVE    KETONES      NEGATIVE  NEGATIVE    OCCULT BLOOD      NEGATIVE  1+ !    Protein Urine      NEGATIVE  NEGATIVE    Nitrite Urine      NEGATIVE  NEGATIVE    Leukocyte Esterase       NEGATIVE  NEGATIVE    WBC Urine      < OR = 5 /HPF 0-5    RED BLOOD CELLS      < OR = 2 /HPF NONE SEEN    SQUAMOUS EPITHELIAL CELLS      < OR = 5 /HPF 10-20 !    BACTERIA      NONE SEEN /HPF FEW !    HYALINE CAST      NONE SEEN /LPF NONE SEEN    NOTE --    WBC      3.8 - 10.8 Thousand/uL 7.0    RBC      3.80 - 5.10 Million/uL 4.04    Hemoglobin      11.7 - 15.5 g/dL 13.0    Hematocrit      35.0 - 45.0 % 40.0    MCV      80.0 - 100.0 fL 99.0    MCH      27.0 - 33.0 pg 32.2    MCHC      32.0 - 36.0 g/dL 32.5    RDW      11.0 - 15.0 % 11.8    Platelet Count      140  - 400 Thousand/uL 269    MPV      7.5 - 12.5 fL 10.1    JOSE ANTONIO SCREEN, IFA      NEGATIVE  POSITIVE !    DNA (DS) ANTIBODY      IU/mL <1    SCL-70 ANTIBODY      <1.0 NEG AI <1.0 NEG    SM ANTIBODY      <1.0 NEG AI <1.0 NEG    SM/RNP ANTIBODY      <1.0 NEG AI <1.0 NEG    SJOGREN'S ANTIBODY (SS-A)      <1.0 NEG AI >8.0 POS !    SJOGREN'S ANTIBODY (SS-B)      <1.0 NEG AI <1.0 NEG    JOSE ANTONIO TITER      titer 1:1280 (H)    JOSE ANTONIO PATTERN Nuclear, Speckled !    SED RATE BY MODIFIED$WESTERGREN      < OR = 20 mm/h 22 (H)    C-REACTIVE PROTEIN      <8.0 mg/L 4.8    RHEUMATOID FACTOR      <14 IU/mL <10    CYCLIC CITRULLINATED$PEPTIDE (CCP) AB (IGG)      UNITS 22 (H)    CREATINE KINASE, TOTAL      29 - 143 U/L 57    COMPLEMENT COMPONENT C3C      83 - 193 mg/dL 135    COMPLEMENT COMPONENT C4C      15 - 57 mg/dL 27    Aldolase      < OR = 8.1 U/L 3.9       Maren Burch MD  12/17/2024

## 2024-12-09 NOTE — PATIENT INSTRUCTIONS
1 check labs   2. Start Prendisone 5mg - Take 6 tabsx 1 day, take 5 tabsx 1 day, take 4 tabsx 1 day,take 3 tabsx 1 day, take 2 tabsx 1 day, take 1 tabsx 1 day, then off  3. Return to clinic in 3months.   4. Chest xray - for pleurisy

## 2024-12-11 LAB
ALBUMIN/GLOBULIN RATIO: 1.2 (CALC) (ref 1–2.5)
ALBUMIN: 4.1 G/DL (ref 3.6–5.1)
ALDOLASE: 3.9 U/L
ALKALINE PHOSPHATASE: 66 U/L (ref 31–125)
ALT: 27 U/L (ref 6–29)
ANA SCREEN, IFA: POSITIVE
APPEARANCE: CLEAR
AST: 27 U/L (ref 10–35)
BILIRUBIN, TOTAL: 0.3 MG/DL (ref 0.2–1.2)
BILIRUBIN: NEGATIVE
BUN: 15 MG/DL (ref 7–25)
C-REACTIVE PROTEIN: 4.8 MG/L
CALCIUM: 9.3 MG/DL (ref 8.6–10.2)
CARBON DIOXIDE: 24 MMOL/L (ref 20–32)
CHLORIDE: 102 MMOL/L (ref 98–110)
COLOR: YELLOW
COMPLEMENT COMPONENT C3C: 135 MG/DL (ref 83–193)
COMPLEMENT COMPONENT C4C: 27 MG/DL (ref 15–57)
CREATINE KINASE, TOTAL: 57 U/L (ref 29–143)
CREATININE: 0.73 MG/DL (ref 0.5–0.99)
CYCLIC CITRULLINATED$PEPTIDE (CCP) AB (IGG): 22 UNITS
DNA (DS) ANTIBODY: <1 IU/ML
EGFR: 101 ML/MIN/1.73M2
GLOBULIN: 3.3 G/DL (CALC) (ref 1.9–3.7)
GLUCOSE: 83 MG/DL (ref 65–139)
GLUCOSE: NEGATIVE
HEMATOCRIT: 40 % (ref 35–45)
HEMOGLOBIN: 13 G/DL (ref 11.7–15.5)
KETONES: NEGATIVE
LEUKOCYTE ESTERASE: NEGATIVE
MCH: 32.2 PG (ref 27–33)
MCHC: 32.5 G/DL (ref 32–36)
MCV: 99 FL (ref 80–100)
MPV: 10.1 FL (ref 7.5–12.5)
NITRITE: NEGATIVE
PH: 6 (ref 5–8)
PLATELET COUNT: 269 THOUSAND/UL (ref 140–400)
POTASSIUM: 4.1 MMOL/L (ref 3.5–5.3)
PROTEIN, TOTAL: 7.4 G/DL (ref 6.1–8.1)
PROTEIN: NEGATIVE
RDW: 11.8 % (ref 11–15)
RED BLOOD CELL COUNT: 4.04 MILLION/UL (ref 3.8–5.1)
RHEUMATOID FACTOR: <10 IU/ML
SED RATE BY MODIFIED$WESTERGREN: 22 MM/H
SODIUM: 138 MMOL/L (ref 135–146)
SPECIFIC GRAVITY: 1.01 (ref 1–1.03)
WHITE BLOOD CELL COUNT: 7 THOUSAND/UL (ref 3.8–10.8)

## 2025-02-13 ENCOUNTER — PATIENT MESSAGE (OUTPATIENT)
Dept: RHEUMATOLOGY | Facility: CLINIC | Age: 50
End: 2025-02-13

## 2025-04-02 ENCOUNTER — OFFICE VISIT (OUTPATIENT)
Age: 50
End: 2025-04-02
Payer: COMMERCIAL

## 2025-04-02 VITALS
BODY MASS INDEX: 36.81 KG/M2 | HEIGHT: 64 IN | DIASTOLIC BLOOD PRESSURE: 63 MMHG | WEIGHT: 215.63 LBS | RESPIRATION RATE: 16 BRPM | HEART RATE: 76 BPM | SYSTOLIC BLOOD PRESSURE: 106 MMHG

## 2025-04-02 DIAGNOSIS — M25.50 POLYARTHRALGIA: ICD-10-CM

## 2025-04-02 DIAGNOSIS — M35.00 SJOGREN'S SYNDROME, WITH UNSPECIFIED ORGAN INVOLVEMENT (HCC): Primary | ICD-10-CM

## 2025-04-02 DIAGNOSIS — R09.1 PLEURISY: ICD-10-CM

## 2025-04-02 PROCEDURE — 99214 OFFICE O/P EST MOD 30 MIN: CPT | Performed by: INTERNAL MEDICINE

## 2025-04-02 RX ORDER — CYCLOBENZAPRINE HCL 5 MG
TABLET ORAL
Qty: 90 TABLET | Refills: 1 | Status: SHIPPED | OUTPATIENT
Start: 2025-04-02

## 2025-04-02 RX ORDER — HYDROXYCHLOROQUINE SULFATE 200 MG/1
200 TABLET, FILM COATED ORAL 2 TIMES DAILY
Qty: 180 TABLET | Refills: 2 | Status: SHIPPED | OUTPATIENT
Start: 2025-04-02

## 2025-04-02 NOTE — PATIENT INSTRUCTIONS
1 check labs in 6months   2. Cont. Hydroxychlroquine 200mg twice a day -   3. Return to clinic in 3months.   4. Eye doctor -   Dr. Cabrera/Dr. Willett - -   [facility] [address] [id number] [phone]    [facility] [address] [id number] [phone]

## 2025-04-02 NOTE — PROGRESS NOTES
Raine Farmer is a 49 year old female who presents for   Chief Complaint   Patient presents with    Sjogren's Syndrome    Medication Follow-Up    Back Pain   .   HPI:     I had the pleasure of seeing Raine Farmer on 12/9/2024 for evaluation.     She is a pleasant 49 year old who is re-establishing care. She has Sjogren's Syndrome, with SSA antibody and positive JOSE ANTONIO.  And fibromyalgia. She was seeing Dr. Simental in the past, rheumatology.   She was last seen in 6/27/2022. She was referred by Dr. Bailey.   She was treated with cyclobenzaprine 5mg at bedtime in the past. Which had been helping her.   She didn't have a rash.  She used mouthwash and systane drops for her sicca sx.      She had elevated liver tests in June of 2016.  Her hepatitis B core IgM antibody was positive in June 2016.  Her itching went away, and her liver tests return to normal.     She is here today on 12/9/2024  She had a stressful year last year - her mom passed.   Her hands have started hurting or several months.   She feels her hands might be a little swollen.   All her joints are way more painful now. Her pain level is 6-7/10 pain - her muscles in arms.    Pain increased in shoulders, elbows, back. , knees, ankles and toes.   Not so much in hips.   She's lifting weight for past couple of years. She does lift 25 -30lbs pound weights -   She's feels also she is getting a rash on her cheeks intermittently. It's worse in the sun. It s was worse this summer. She never had this before.     Advil is not helping.     She has dry eyes and dry mouth.   She tried restasis. She went to eye doctor , dr. Combs in [place] for opthalmolgoist.   No dental issues.     She was on cyclobenzaprine 5mg prn - she started taking it again for pain in the last couple of weeks. This helps her sleep and that' shelping.   Not helping with joint pain.     No swelling of glands.   Does get a little bit more often kanker sores.     Yesterday she started having  trouble breathing - her chest was hurting and thought she should goto the ER.   Was pleurisy -   No fevers.   Never had that before.     No numbness and tignling.   No hx of psoraisis.   She's peeing a lot more than before.   She's just turned 49.   She gets tension headaches , not migraines.   No Raynaud's.   Gets heart burn.     4/2/2025   She's having 4/10 pain - she started hydroxychlrqouine 200mg bid - on 2/20/2025 - it's helping 20% -   She was having flare once month - and never had it the last 1 monht - and that is great for her.   She still is achin ghe rhand and feet - is till 4-5/10 pain   - she has the flares around her menstrual cycle - but this is not happening anymore     She has pain with exercising and pushing heerslef and with trouble sleeping - cyclboenazaprine I shelping    Wt Readings from Last 2 Encounters:   04/02/25 215 lb 9.6 oz (97.8 kg)   12/09/24 206 lb 12.8 oz (93.8 kg)     Body mass index is 37.01 kg/m².      Current Outpatient Medications   Medication Sig Dispense Refill    hydroxychloroquine 200 MG Oral Tab Take 1 tablet (200 mg total) by mouth 2 (two) times daily. 180 tablet 0    Ascorbic Acid (VITAMIN C OR) Take 1 tablet by mouth daily.      cyclobenzaprine 5 MG Oral Tab Take one Q HS. 90 tablet 0    escitalopram 10 MG Oral Tab Take 1 tablet (10 mg total) by mouth daily.      Multiple Vitamins-Minerals (MULTIVITAL) Oral Tab Take 1 tablet by mouth daily.      predniSONE 5 MG Oral Tab Take 6 tabs x 1 day, take 5 tabs x 1 day, take 4 tabs x 1 day, take 3 tabs x 1 day, take 2 tabs x 1 day, take 1 tab x 1 day, then off (Patient not taking: Reported on 4/2/2025) 21 tablet 0      Past Medical History:    Attention deficit hyperactivity disorder (ADHD)    Depression    Fibromyalgia    Hx of motion sickness    PONV (postoperative nausea and vomiting)    Sjogren's disease (HCC)    Visual impairment    glasses      Past Surgical History:   Procedure Laterality Date    D & c      Needle biopsy  left      Reduction of large breast Bilateral 12/05/2023      Family History   Problem Relation Age of Onset    Breast Cancer Mother 78    Hypertension Mother     Heart Attack Mother 52    Bipolar Disorder Mother     Hypertension Father     Hypertension Brother     Breast Cancer Maternal Cousin Female         30s      Social History:  Social History     Socioeconomic History    Marital status: Single   Tobacco Use    Smoking status: Never     Passive exposure: Never    Smokeless tobacco: Never   Vaping Use    Vaping status: Never Used   Substance and Sexual Activity    Alcohol use: Not Currently    Drug use: No    Sexual activity: Not Currently     Social Drivers of Health     Food Insecurity: No Food Insecurity (12/5/2023)    Food Insecurity     Food Insecurity: Never true   Transportation Needs: No Transportation Needs (12/5/2023)    Transportation Needs     Lack of Transportation: No   Housing Stability: Low Risk  (12/5/2023)    Housing Stability     Housing Instability: No           REVIEW OF SYSTEMS:   Review Of Systems:  Fatigue  Constitutional:No fever, no change in weight or appetitie  Derm: No rashes, no oral ulcers, no alopecia, no photosensitivity, no psoriasis  HEENT: No dry eyes, no dry mouth, no Raynaud's, no nasal ulcers, no parotid swelling, no neck pain, no jaw pain, no temple pain  Eyes: No visual changes,   CVS: No chest pain, no heart disease  RS: No SOB, no Cough, No Pleurtic pain,   GI: No nausea, no vomiiting, no abominal pain, no hx of ulcer, no gastritis, no heartburn, no dysphagia, no BRBPR or melena  : no dysuria, no hx of miscarriages, no DVT Hx, no hx of OCP,   Neuro: No numbness or tingling, no headache, no hx of seizures,   Psych: no hx of anxiety or depression  ENDO: no hx of thyroid disease, no hx of DM  Joint/Muscluskeltal: see HPI,   All other ROS are negative.     EXAM:   /63   Pulse 76   Resp 16   Ht 5' 4\" (1.626 m)   Wt 215 lb 9.6 oz (97.8 kg)   BMI 37.01 kg/m²    HEENT: Clear oropharynx, no oral ulcers, EOM intact, clear sclera, PERRLA, pleasant, no acute distress, no CAD,   No rashes  CVS: RRR, no murmurs  RS: CTAB, no crackles, no rhonchi, no sternal tenderness  ABD: Soft Non tender, no HSM felt, BS positive  Joint exam:   no neck tendnerness, good ROM,   Diffuse tender points romina in arms   No synovitis seen in hands   Tender in 1-5th pips , can close both hands.   EXTREMITIES: no cyanosis, clubbing or edema  NEURO: intact touch, 5/5 ue and le strength    Component      Latest Ref Rng 12/9/2024   Glucose      65 - 139 mg/dL 83    BUN      7 - 25 mg/dL 15    CREATININE      0.50 - 0.99 mg/dL 0.73    EGFR      > OR = 60 mL/min/1.73m2 101    BUN/CREATININE RATIO      6 - 22 (calc) SEE NOTE:    Sodium      135 - 146 mmol/L 138    Potassium      3.5 - 5.3 mmol/L 4.1    Chloride      98 - 110 mmol/L 102    Carbon Dioxide, Total      20 - 32 mmol/L 24    CALCIUM      8.6 - 10.2 mg/dL 9.3    PROTEIN, TOTAL      6.1 - 8.1 g/dL 7.4    Albumin      3.6 - 5.1 g/dL 4.1    Globulin      1.9 - 3.7 g/dL (calc) 3.3    A/G Ratio      1.0 - 2.5 (calc) 1.2    Total Bilirubin      0.2 - 1.2 mg/dL 0.3    Alkaline Phosphatase      31 - 125 U/L 66    AST (SGOT)      10 - 35 U/L 27    ALT (SGPT)      6 - 29 U/L 27    Color Urine      YELLOW  YELLOW    APPEARANCE      CLEAR  CLEAR    SPECIFIC GRAVITY      1.001 - 1.035  1.013    PH      5.0 - 8.0  6.0    GLUCOSE      NEGATIVE  NEGATIVE    Bilirubin Urine      NEGATIVE  NEGATIVE    KETONES      NEGATIVE  NEGATIVE    OCCULT BLOOD      NEGATIVE  1+ !    Protein Urine      NEGATIVE  NEGATIVE    Nitrite Urine      NEGATIVE  NEGATIVE    Leukocyte Esterase       NEGATIVE  NEGATIVE    WBC Urine      < OR = 5 /HPF 0-5    RED BLOOD CELLS      < OR = 2 /HPF NONE SEEN    SQUAMOUS EPITHELIAL CELLS      < OR = 5 /HPF 10-20 !    BACTERIA      NONE SEEN /HPF FEW !    HYALINE CAST      NONE SEEN /LPF NONE SEEN    NOTE --    WBC      3.8 - 10.8 Thousand/uL 7.0     RBC      3.80 - 5.10 Million/uL 4.04    Hemoglobin      11.7 - 15.5 g/dL 13.0    Hematocrit      35.0 - 45.0 % 40.0    MCV      80.0 - 100.0 fL 99.0    MCH      27.0 - 33.0 pg 32.2    MCHC      32.0 - 36.0 g/dL 32.5    RDW      11.0 - 15.0 % 11.8    Platelet Count      140 - 400 Thousand/uL 269    MPV      7.5 - 12.5 fL 10.1    JOSE ANTONIO SCREEN, IFA      NEGATIVE  POSITIVE !    DNA (DS) ANTIBODY      IU/mL <1    SCL-70 ANTIBODY      <1.0 NEG AI <1.0 NEG    SM ANTIBODY      <1.0 NEG AI <1.0 NEG    SM/RNP ANTIBODY      <1.0 NEG AI <1.0 NEG    SJOGREN'S ANTIBODY (SS-A)      <1.0 NEG AI >8.0 POS !    SJOGREN'S ANTIBODY (SS-B)      <1.0 NEG AI <1.0 NEG    JOSE ANTONIO TITER      titer 1:1280 (H)    JOSE ANTONIO PATTERN Nuclear, Speckled !    SED RATE BY MODIFIED$WESTERGREN      < OR = 20 mm/h 22 (H)    C-REACTIVE PROTEIN      <8.0 mg/L 4.8    RHEUMATOID FACTOR      <14 IU/mL <10    CYCLIC CITRULLINATED$PEPTIDE (CCP) AB (IGG)      UNITS 22 (H)    CREATINE KINASE, TOTAL      29 - 143 U/L 57    COMPLEMENT COMPONENT C3C      83 - 193 mg/dL 135    COMPLEMENT COMPONENT C4C      15 - 57 mg/dL 27    Aldolase      < OR = 8.1 U/L 3.9         Component      Latest Ref [facility] 6/27/2022   PROTEIN, TOTAL      6.4 - 8.2 g/dL 7.7    Albumin      3.75 - 5.21 g/dL 4.30    ALPHA-1-GLOBULINS      0.19 - 0.46 g/dL 0.25    ALPHA-2-GLOBULINS      0.48 - 1.05 g/dL 0.74    BETA GLOBULINS      0.68 - 1.23 g/dL 1.12    GAMMA GLOBULINS      0.62 - 1.70 g/dL 1.29    ALBUMIN/GLOBULIN RATIO      1.00 - 2.00  1.26    SPE INTERPRETATION Serum protein electrophoresis shows no evidence of significant abnormalities.    Reviewed By: Teddy Galaviz M.D.    COMPLEMENT C3      90.0 - 180.0 mg/dL 117.0    COMPLEMENT C4      10.0 - 40.0 mg/dL 31.8    SED RATE      0 - 20 mm/Hr      Component      Latest Ref Rn 12/9/2024   Glucose      65 - 139 mg/dL 83    BUN      7 - 25 mg/dL 15    CREATININE      0.50 - 0.99 mg/dL 0.73    EGFR      > OR = 60 mL/min/1.73m2 101    BUN/CREATININE  RATIO      6 - 22 (calc) SEE NOTE:    Sodium      135 - 146 mmol/L 138    Potassium      3.5 - 5.3 mmol/L 4.1    Chloride      98 - 110 mmol/L 102    Carbon Dioxide, Total      20 - 32 mmol/L 24    CALCIUM      8.6 - 10.2 mg/dL 9.3    PROTEIN, TOTAL      6.1 - 8.1 g/dL 7.4    Albumin      3.6 - 5.1 g/dL 4.1    Globulin      1.9 - 3.7 g/dL (calc) 3.3    A/G Ratio      1.0 - 2.5 (calc) 1.2    Total Bilirubin      0.2 - 1.2 mg/dL 0.3    Alkaline Phosphatase      31 - 125 U/L 66    AST (SGOT)      10 - 35 U/L 27    ALT (SGPT)      6 - 29 U/L 27    Color Urine      YELLOW  YELLOW    APPEARANCE      CLEAR  CLEAR    SPECIFIC GRAVITY      1.001 - 1.035  1.013    PH      5.0 - 8.0  6.0    GLUCOSE      NEGATIVE  NEGATIVE    Bilirubin Urine      NEGATIVE  NEGATIVE    KETONES      NEGATIVE  NEGATIVE    OCCULT BLOOD      NEGATIVE  1+ !    Protein Urine      NEGATIVE  NEGATIVE    Nitrite Urine      NEGATIVE  NEGATIVE    Leukocyte Esterase       NEGATIVE  NEGATIVE    WBC Urine      < OR = 5 /HPF 0-5    RED BLOOD CELLS      < OR = 2 /HPF NONE SEEN    SQUAMOUS EPITHELIAL CELLS      < OR = 5 /HPF 10-20 !    BACTERIA      NONE SEEN /HPF FEW !    HYALINE CAST      NONE SEEN /LPF NONE SEEN    NOTE --    WBC      3.8 - 10.8 Thousand/uL 7.0    RBC      3.80 - 5.10 Million/uL 4.04    Hemoglobin      11.7 - 15.5 g/dL 13.0    Hematocrit      35.0 - 45.0 % 40.0    MCV      80.0 - 100.0 fL 99.0    MCH      27.0 - 33.0 pg 32.2    MCHC      32.0 - 36.0 g/dL 32.5    RDW      11.0 - 15.0 % 11.8    Platelet Count      140 - 400 Thousand/uL 269    MPV      7.5 - 12.5 fL 10.1    JOSE ANTONIO SCREEN, IFA      NEGATIVE  POSITIVE !    DNA (DS) ANTIBODY      IU/mL <1    SCL-70 ANTIBODY      <1.0 NEG AI <1.0 NEG    SM ANTIBODY      <1.0 NEG AI <1.0 NEG    SM/RNP ANTIBODY      <1.0 NEG AI <1.0 NEG    SJOGREN'S ANTIBODY (SS-A)      <1.0 NEG AI >8.0 POS !    SJOGREN'S ANTIBODY (SS-B)      <1.0 NEG AI <1.0 NEG    JOSE ANTONIO TITER      titer 1:1280 (H)    JOSE ANTONIO PATTERN Nuclear,  Speckled !    SED RATE BY MODIFIED$WESTERGREN      < OR = 20 mm/h 22 (H)    C-REACTIVE PROTEIN      <8.0 mg/L 4.8    RHEUMATOID FACTOR      <14 IU/mL <10    CYCLIC CITRULLINATED$PEPTIDE (CCP) AB (IGG)      UNITS 22 (H)    CREATINE KINASE, TOTAL      29 - 143 U/L 57    COMPLEMENT COMPONENT C3C      83 - 193 mg/dL 135    COMPLEMENT COMPONENT C4C      15 - 57 mg/dL 27    Aldolase      < OR = 8.1 U/L 3.9       Legend:  ! Abnormal  (H) High  8/12/2023 -   JOSE ANTONIO 1:640     3/28/2024  Wbc is 6.1, hb i13.2, plta re 269,   Crp si 7.0mg/dL,   Cmp - cr ia 0.78, ast is 22, alt is 18, , hba 1c is 5.5,     12/9/2024 - chest xray   No acute cardiopulmonary disease   ASSESSMENT AND PLAN:   Raine Farmer is a 49 year old female who presents for   Chief Complaint   Patient presents with    Sjogren's Syndrome    Medication Follow-Up    Back Pain     Sjogrens positive SSA - -> 8.0, JOSE ANTONIO 1:1280, borderline CCP -  re-establishing care - seems like she is having a flare up   Labs  inflammatory arthritis and/or connective tissue disease   S/p and was Better on  in 12/2024 with pred 30mg burst   Messaged and started hydroxychrloquine 200mg bid - on 2/20/2025 -   D/w her to plan for eye check up thsi year   Discussed risks and benefits of medication with patient , including retinal toxicity risk and importance of regular monitoring on the medication.     Will monitor her symptoms   Rtc in 6months.     2. Fibromyaliga - overlap - on cyclobnezparine - doing better ont hsi for sleep -     3. Pleurisy - uring flareof her sjogrnes - 12/2024chest xray - normal- no longer havin this.     4. Borderline ccp test - repeat in 6months.     5.. History of cholestasis and elevated liver tests. - now very stable   - in thie past Her hepatitis B core IgM antibody was positive June of 2016. Her liver tests returned to normal. Sjogren's can be associated with histologic changes of primary biliary cirrhosis. There can be lab abnormalities, usually mild in  Sjogren's. There can be autoimmune hepatitis. It can be hepatocellular or primarily cholestasis.  She had another episode with itching in March of 2017, likely from birth control pills.  She has been off BCPs since, without recurrence.     Summary:  1 check labs in 6months   2. Cont. Hydroxychlroquine 200mg twice a day -   3. Return to clinic in 3months.   4. Eye doctor -   Dr. Cabrera/Dr. Willett - -   [facility] [address] [id number] [phone]    [facility] [address] [address]  [phone]    Maren Burch MD  4/2/2025   9:52 AM

## 2025-05-21 RX ORDER — HYDROXYCHLOROQUINE SULFATE 200 MG/1
200 TABLET, FILM COATED ORAL 2 TIMES DAILY
Qty: 180 TABLET | Refills: 2 | OUTPATIENT
Start: 2025-05-21

## 2025-05-22 RX ORDER — HYDROXYCHLOROQUINE SULFATE 200 MG/1
200 TABLET, FILM COATED ORAL 2 TIMES DAILY
Qty: 180 TABLET | Refills: 2 | OUTPATIENT
Start: 2025-05-22

## (undated) DEVICE — SUTURE PDS II SZ 2-0 L27IN ABSRB VLT L26MM

## (undated) DEVICE — PROXIMATE SKIN STAPLERS (35 WIDE) CONTAINS 35 STAINLESS STEEL STAPLES (FIXED HEAD): Brand: PROXIMATE

## (undated) DEVICE — INTENDED FOR TISSUE SEPARATION, AND OTHER PROCEDURES THAT REQUIRE A SHARP SURGICAL BLADE TO PUNCTURE OR CUT.: Brand: BARD-PARKER ® STAINLESS STEEL BLADES

## (undated) DEVICE — UNDYED BRAIDED (POLYGLACTIN 910), SYNTHETIC ABSORBABLE SUTURE: Brand: COATED VICRYL

## (undated) DEVICE — SUTURE PROL SZ 4-0 L18IN NONABSORB BLU

## (undated) DEVICE — 3M™ BAIR HUGGER® UNDERBODY BLANKET, FULL ACCESS, 10 PER CASE 63500: Brand: BAIR HUGGER™

## (undated) DEVICE — 3M(TM) STERI-STRIP(TM) ANTIMICROBIAL SKIN CLOSURES (REINFORCED) A1846: Brand: 3M™ STERI-STRIP™

## (undated) DEVICE — KIT INFUS PMP 270ML 4ML/HR 2ML/SITE SOAK CATH .

## (undated) DEVICE — ENCORE® LATEX MICRO SIZE 6.5, STERILE LATEX POWDER-FREE SURGICAL GLOVE: Brand: ENCORE

## (undated) DEVICE — APPLICATOR SWAB L6IN GEN PURP INDIVIDUALLY .

## (undated) DEVICE — SKIN PREP TRAY 4 COMPARTM TRAY: Brand: MEDLINE INDUSTRIES, INC.

## (undated) DEVICE — SUCTION CANISTER, 3000CC,SAFELINER: Brand: DEROYAL

## (undated) DEVICE — SPONGE LAP W18XL18IN WHT COT 4 PLY FLD STRUNG

## (undated) DEVICE — CG INFILTRATION TUBING: Brand: CG INFILTRATION TUBING

## (undated) DEVICE — Device

## (undated) DEVICE — 3M™ STERI-DRAPE™ INSTRUMENT POUCH 1018: Brand: STERI-DRAPE™

## (undated) DEVICE — BLADE SS #10 STER

## (undated) DEVICE — Device: Brand: JELCO

## (undated) DEVICE — SUTURE. ETHLN SZ 4-0 L18IN NONABSORBABLE BLK .

## (undated) DEVICE — ADHESIVE LIQ 2/3ML VI MASTISOL

## (undated) DEVICE — CABLE BPLR L12FT FLYING LD DISP

## (undated) DEVICE — BIOPATCH™ ANTIMICROBIAL DRESSING WITH CHLORHEXIDINE GLUCONATE IS A HYDROPHILLIC POLYURETHANE ABSORPTIVE FOAM WITH CHLORHEXIDINE GLUCONATE (CHG) WHICH INHIBITS BACTERIAL GROWTH UNDER THE DRESSING. THE DRESSING IS INTENDED TO BE USED TO ABSORB EXUDATE, COVER A WOUND CAUSED BY VASCULAR AND NONVASCULAR PERCUTANEOUS MEDICAL DEVICES DURING SURGERY, AS WELL AS REDUCE LOCAL INFECTION AND COLONIZATION OF MICROORGANISMS.: Brand: BIOPATCH

## (undated) DEVICE — INTENDED USE FOR SURGICAL MARKING ON INTACT SKIN, ALSO PROVIDES A PERMANENT METHOD OF IDENTIFYING OBJECTS IN THE OPERATING ROOM: Brand: WRITESITE® PLUS MINI PREP RESISTANT MARKER

## (undated) DEVICE — EVACUATOR SUR 100CC SIL BLB WND

## (undated) DEVICE — SOLUTION IRRIG 1000ML 0.9% NACL USP BTL

## (undated) DEVICE — SUTURE VCRL SZ 2-0 L27IN ABSRB UD L24MM FS-1

## (undated) DEVICE — GAMMEX® PI HYBRID SIZE 6.5, STERILE POWDER-FREE SURGICAL GLOVE, POLYISOPRENE AND NEOPRENE BLEND: Brand: GAMMEX

## (undated) DEVICE — OR TOWEL, 17" X 26" STERILE, BLUE: Brand: PREMIERPRO

## (undated) DEVICE — 3M™ STERI-STRIP™ REINFORCED ADHESIVE SKIN CLOSURES, R1547, 1/2 IN X 4 IN (12 MM X 100 MM), 6 STRIPS/ENVELOPE: Brand: 3M™ STERI-STRIP™

## (undated) DEVICE — BANDAGE,GAUZE,BULKEE II,4.5"X4.1YD,STRL: Brand: MEDLINE

## (undated) DEVICE — DRAIN SURG W7MMXL20CM SIL HUBLESS FLAT FLL

## (undated) DEVICE — MAJOR GENERAL: Brand: MEDLINE INDUSTRIES, INC.

## (undated) NOTE — LETTER
[address]  Authorization for Surgical Operation and Procedure                                                                                           1. I hereby authorize DR. Brendan Trevino, my physician and his/her assistants (if applicable), which may include medical students, residents, and/or fellows, to perform the following surgical operation/ procedure and administer such anesthesia as may be determined necessary by my physician: ULTRASOUND GUIDED LEFT BREAST BIOPSY WITH CLIP PLACEMENT  on [employer]   2. I recognize that during the surgical operation/procedure, unforeseen conditions may necessitate additional or different procedures than those listed above. I, therefore, further authorize and request that the above-named surgeon, assistants, or designees perform such procedures as are, in their judgment, necessary and desirable. 3.   My surgeon/physician has discussed prior to my surgery the potential benefits, risks and side effects of this procedure; the likelihood of achieving goals; and potential problems that might occur during recuperation. They also discussed reasonable alternatives to the procedure, including risks, benefits, and side effects related to the alternatives and risks related to not receiving this procedure. I have had all my questions answered and I acknowledge that no guarantee has been made as to the result that may be obtained. 4.   Should the need arise during my operation/procedure, which includes change of level of care prior to discharge, I also consent to the administration of blood and/or blood products. Further, I understand that despite careful testing and screening of blood or blood products by collecting agencies, I may still be subject to ill effects as a result of receiving a blood transfusion and/or blood products.   The following are some, but not all, of the potential risks that can occur: fever and allergic reactions, hemolytic reactions, transmission of diseases such as Hepatitis, AIDS and Cytomegalovirus (CMV) and fluid overload. In the event that I wish to have an autologous transfusion of my own blood, or a directed donor transfusion, I will discuss this with my physician. Check only if Refusing Blood or Blood Products  I understand refusal of blood or blood products as deemed necessary by my physician may have serious consequences to my condition to include possible death. I hereby assume responsibility for my refusal and release the hospital, its personnel, and my physicians from any responsibility for the consequences of my refusal.    o  Refuse   5. I authorize the use of any specimen, organs, tissues, body parts or foreign objects that may be removed from my body during the operation/procedure for diagnosis, research or teaching purposes and their subsequent disposal by hospital authorities. I also authorize the release of specimen test results and/or written reports to my treating physician on the hospital medical staff or other referring or consulting physicians involved in my care, at the discretion of the Pathologist or my treating physician. 6.   I consent to the photographing or videotaping of the operations or procedures to be performed, including appropriate portions of my body for medical, scientific, or educational purposes, provided my identity is not revealed by the pictures or by descriptive texts accompanying them. If the procedure has been photographed/videotaped, the surgeon will obtain the original picture, image, videotape or CD. The hospital will not be responsible for storage, release or maintenance of the picture, image, tape or CD.    7.   I consent to the presence of a  or observers in the operating room as deemed necessary by my physician or their designees.     8.   I recognize that in the event my procedure results in extended X-Ray/fluoroscopy time, I may develop a skin reaction. 9. If I have a Do Not Attempt Resuscitation (DNAR) order in place, that status will be suspended while in the operating room, procedural suite, and during the recovery period unless otherwise explicitly stated by me (or a person authorized to consent on my behalf). The surgeon or my attending physician will determine when the applicable recovery period ends for purposes of reinstating the DNAR order. 10. Patients having a sterilization procedure: I understand that if the procedure is successful the results will be permanent and it will therefore be impossible for me to inseminate, conceive, or bear children. I also understand that the procedure is intended to result in sterility, although the result has not been guaranteed. 11. I acknowledge that my physician has explained sedation/analgesia administration to me including the risk and benefits I consent to the administration of sedation/analgesia as may be necessary or desirable in the judgment of my physician. I CERTIFY THAT I HAVE READ AND FULLY UNDERSTAND THE ABOVE CONSENT TO OPERATION and/or OTHER PROCEDURE.     _________________________________________ _________________________________     ___________________________________  Signature of Patient     Signature of Responsible Person                   Printed Name of Responsible Person                              _________________________________________ ______________________________        ___________________________________  Signature of Witness         Date  Time         Relationship to Patient    STATEMENT OF PHYSICIAN My signature below affirms that prior to the time of the procedure; I have explained to the patient and/or his/her legal representative, the risks and benefits involved in the proposed treatment and any reasonable alternative to the proposed treatment.  I have also explained the risks and benefits involved in refusal of the proposed treatment and alternatives to the proposed treatment and have answered the patient's questions.  If I have a significant financial interest in a co-management agreement or a significant financial interest in any product or implant, or other significant relationship used in this procedure/surgery, I have disclosed this and had a discussion with my patient.     _______________________________________________________________ _____________________________  Wojciech Pastel of Physician)                                                                                         (Date)                                   (Time)  Patient Name: Tammy Loyola    : 10/31/1975   Printed: 3/8/2023      Medical Record #: [id number]                                              Page 1 of 1

## (undated) NOTE — MR AVS SNAPSHOT
[address] [address]  [phone]               Thank you for choosing us for your health care visit with Alesia Gomez MD.  We are glad to serve you and happy to provide you with this sum Dizziness           Medical Issues Discussed Today     Fibromyalgia    Sjogren's syndrome    Fatigue, unspecified type          Instructions and Information about Your Health     None      Allergies as of Mar 06, 2017     No Known Allergies

## (undated) NOTE — Clinical Note
January 12, 2017         [facility]      Patient: Maddie Glez   YOB: 1975   Date of Visit: 1/11/2017       Dear Dr. Nhan Horton:    I saw Therelon Hunterr in abnormalities, usually mild in Sjogren's. There can be autoimmune hepatitis. It can be hepatocellular or primarily cholestasis. 3.  Apparent Sjogren's syndrome, with dry eyes and dry mouth, SSA antibody, positive JOSE ANTONIO.   Her symptoms are mild at this

## (undated) NOTE — MR AVS SNAPSHOT
[facility]  [address] [place] [address], [address] [place] [address]  [facility]               Thank you for choosing us for your health care visit with Sonali Barba MD.  We are glad to serve you and happy to provide you with this summary